# Patient Record
Sex: MALE | Race: WHITE | NOT HISPANIC OR LATINO | Employment: OTHER | URBAN - METROPOLITAN AREA
[De-identification: names, ages, dates, MRNs, and addresses within clinical notes are randomized per-mention and may not be internally consistent; named-entity substitution may affect disease eponyms.]

---

## 2020-09-25 ENCOUNTER — OFFICE VISIT (OUTPATIENT)
Dept: INTERNAL MEDICINE | Facility: CLINIC | Age: 55
End: 2020-09-25

## 2020-09-25 ENCOUNTER — LAB (OUTPATIENT)
Dept: LAB | Facility: HOSPITAL | Age: 55
End: 2020-09-25

## 2020-09-25 VITALS
TEMPERATURE: 97.1 F | SYSTOLIC BLOOD PRESSURE: 132 MMHG | HEART RATE: 88 BPM | BODY MASS INDEX: 49.15 KG/M2 | RESPIRATION RATE: 16 BRPM | HEIGHT: 65 IN | OXYGEN SATURATION: 98 % | DIASTOLIC BLOOD PRESSURE: 80 MMHG | WEIGHT: 295 LBS

## 2020-09-25 DIAGNOSIS — H60.311 ACUTE DIFFUSE OTITIS EXTERNA OF RIGHT EAR: ICD-10-CM

## 2020-09-25 DIAGNOSIS — M54.41 CHRONIC RIGHT-SIDED LOW BACK PAIN WITH RIGHT-SIDED SCIATICA: ICD-10-CM

## 2020-09-25 DIAGNOSIS — Z13.29 THYROID DISORDER SCREENING: ICD-10-CM

## 2020-09-25 DIAGNOSIS — Z13.220 LIPID SCREENING: ICD-10-CM

## 2020-09-25 DIAGNOSIS — E55.9 VITAMIN D DEFICIENCY: ICD-10-CM

## 2020-09-25 DIAGNOSIS — G89.29 CHRONIC RIGHT-SIDED LOW BACK PAIN WITH RIGHT-SIDED SCIATICA: ICD-10-CM

## 2020-09-25 DIAGNOSIS — Z13.1 SCREENING FOR DIABETES MELLITUS: ICD-10-CM

## 2020-09-25 DIAGNOSIS — H65.191 OTHER NON-RECURRENT ACUTE NONSUPPURATIVE OTITIS MEDIA OF RIGHT EAR: ICD-10-CM

## 2020-09-25 DIAGNOSIS — Z13.21 ENCOUNTER FOR VITAMIN DEFICIENCY SCREENING: ICD-10-CM

## 2020-09-25 DIAGNOSIS — R73.01 IMPAIRED FASTING GLUCOSE: ICD-10-CM

## 2020-09-25 DIAGNOSIS — I10 ESSENTIAL HYPERTENSION: Primary | ICD-10-CM

## 2020-09-25 LAB
25(OH)D3 SERPL-MCNC: 42.5 NG/ML (ref 30–100)
ALBUMIN SERPL-MCNC: 4.3 G/DL (ref 3.5–5.2)
ALBUMIN/GLOB SERPL: 1.5 G/DL
ALP SERPL-CCNC: 90 U/L (ref 39–117)
ALT SERPL W P-5'-P-CCNC: 36 U/L (ref 1–41)
ANION GAP SERPL CALCULATED.3IONS-SCNC: 7.3 MMOL/L (ref 5–15)
AST SERPL-CCNC: 25 U/L (ref 1–40)
BILIRUB SERPL-MCNC: 0.4 MG/DL (ref 0–1.2)
BUN SERPL-MCNC: 18 MG/DL (ref 6–20)
BUN/CREAT SERPL: 15.4 (ref 7–25)
CALCIUM SPEC-SCNC: 9.4 MG/DL (ref 8.6–10.5)
CHLORIDE SERPL-SCNC: 99 MMOL/L (ref 98–107)
CHOLEST SERPL-MCNC: 287 MG/DL (ref 0–200)
CO2 SERPL-SCNC: 32.7 MMOL/L (ref 22–29)
CREAT SERPL-MCNC: 1.17 MG/DL (ref 0.76–1.27)
DEPRECATED RDW RBC AUTO: 42.3 FL (ref 37–54)
ERYTHROCYTE [DISTWIDTH] IN BLOOD BY AUTOMATED COUNT: 13 % (ref 12.3–15.4)
FOLATE SERPL-MCNC: 9.7 NG/ML (ref 4.78–24.2)
GFR SERPL CREATININE-BSD FRML MDRD: 65 ML/MIN/1.73
GLOBULIN UR ELPH-MCNC: 2.9 GM/DL
GLUCOSE SERPL-MCNC: 98 MG/DL (ref 65–99)
HBA1C MFR BLD: 5.57 % (ref 4.8–5.6)
HCT VFR BLD AUTO: 49.8 % (ref 37.5–51)
HDLC SERPL-MCNC: 37 MG/DL (ref 40–60)
HGB BLD-MCNC: 17.4 G/DL (ref 13–17.7)
LDLC SERPL CALC-MCNC: 221 MG/DL (ref 0–100)
LDLC/HDLC SERPL: 5.97 {RATIO}
MCH RBC QN AUTO: 31.2 PG (ref 26.6–33)
MCHC RBC AUTO-ENTMCNC: 34.9 G/DL (ref 31.5–35.7)
MCV RBC AUTO: 89.2 FL (ref 79–97)
PLATELET # BLD AUTO: 296 10*3/MM3 (ref 140–450)
PMV BLD AUTO: 10.9 FL (ref 6–12)
POTASSIUM SERPL-SCNC: 4 MMOL/L (ref 3.5–5.2)
PROT SERPL-MCNC: 7.2 G/DL (ref 6–8.5)
RBC # BLD AUTO: 5.58 10*6/MM3 (ref 4.14–5.8)
SODIUM SERPL-SCNC: 139 MMOL/L (ref 136–145)
TRIGL SERPL-MCNC: 146 MG/DL (ref 0–150)
TSH SERPL DL<=0.05 MIU/L-ACNC: 1.1 UIU/ML (ref 0.27–4.2)
VIT B12 BLD-MCNC: 1087 PG/ML (ref 211–946)
VLDLC SERPL-MCNC: 29.2 MG/DL (ref 5–40)
WBC # BLD AUTO: 13.65 10*3/MM3 (ref 3.4–10.8)

## 2020-09-25 PROCEDURE — 85027 COMPLETE CBC AUTOMATED: CPT | Performed by: NURSE PRACTITIONER

## 2020-09-25 PROCEDURE — 80053 COMPREHEN METABOLIC PANEL: CPT | Performed by: NURSE PRACTITIONER

## 2020-09-25 PROCEDURE — 82306 VITAMIN D 25 HYDROXY: CPT | Performed by: NURSE PRACTITIONER

## 2020-09-25 PROCEDURE — 82607 VITAMIN B-12: CPT | Performed by: NURSE PRACTITIONER

## 2020-09-25 PROCEDURE — 83036 HEMOGLOBIN GLYCOSYLATED A1C: CPT | Performed by: NURSE PRACTITIONER

## 2020-09-25 PROCEDURE — 99214 OFFICE O/P EST MOD 30 MIN: CPT | Performed by: NURSE PRACTITIONER

## 2020-09-25 PROCEDURE — 84443 ASSAY THYROID STIM HORMONE: CPT | Performed by: NURSE PRACTITIONER

## 2020-09-25 PROCEDURE — 82746 ASSAY OF FOLIC ACID SERUM: CPT | Performed by: NURSE PRACTITIONER

## 2020-09-25 PROCEDURE — 80061 LIPID PANEL: CPT | Performed by: NURSE PRACTITIONER

## 2020-09-25 RX ORDER — CHLORTHALIDONE 25 MG/1
25 TABLET ORAL DAILY
COMMUNITY
Start: 2020-08-10 | End: 2020-09-25

## 2020-09-25 RX ORDER — OFLOXACIN 3 MG/ML
5 SOLUTION AURICULAR (OTIC) 2 TIMES DAILY
Qty: 5 ML | Refills: 0 | Status: SHIPPED | OUTPATIENT
Start: 2020-09-25 | End: 2020-10-02

## 2020-09-25 RX ORDER — PREDNISONE 1 MG/1
TABLET ORAL
Qty: 21 TABLET | Refills: 0 | Status: SHIPPED | OUTPATIENT
Start: 2020-09-25 | End: 2020-10-14

## 2020-09-25 RX ORDER — TAMSULOSIN HYDROCHLORIDE 0.4 MG/1
1 CAPSULE ORAL AS NEEDED
COMMUNITY
Start: 2020-08-10 | End: 2021-05-10 | Stop reason: SDUPTHER

## 2020-09-25 RX ORDER — AMOXICILLIN AND CLAVULANATE POTASSIUM 875; 125 MG/1; MG/1
1 TABLET, FILM COATED ORAL 2 TIMES DAILY
Qty: 20 TABLET | Refills: 0 | Status: SHIPPED | OUTPATIENT
Start: 2020-09-25 | End: 2020-10-05

## 2020-09-25 RX ORDER — NAPROXEN 500 MG/1
500 TABLET ORAL 2 TIMES DAILY PRN
Qty: 60 TABLET | Refills: 2 | Status: SHIPPED | OUTPATIENT
Start: 2020-09-25 | End: 2020-12-09 | Stop reason: HOSPADM

## 2020-09-25 RX ORDER — CYCLOBENZAPRINE HCL 10 MG
10 TABLET ORAL 3 TIMES DAILY PRN
Qty: 90 TABLET | Refills: 2 | Status: SHIPPED | OUTPATIENT
Start: 2020-09-25 | End: 2021-05-10 | Stop reason: SDUPTHER

## 2020-09-29 RX ORDER — ROSUVASTATIN CALCIUM 5 MG/1
5 TABLET, COATED ORAL NIGHTLY
Qty: 30 TABLET | Refills: 5 | Status: SHIPPED | OUTPATIENT
Start: 2020-09-29 | End: 2021-03-09

## 2020-10-14 ENCOUNTER — OFFICE VISIT (OUTPATIENT)
Dept: INTERNAL MEDICINE | Facility: CLINIC | Age: 55
End: 2020-10-14

## 2020-10-14 ENCOUNTER — HOSPITAL ENCOUNTER (OUTPATIENT)
Dept: GENERAL RADIOLOGY | Facility: HOSPITAL | Age: 55
Discharge: HOME | End: 2020-10-14
Admitting: NURSE PRACTITIONER

## 2020-10-14 VITALS
WEIGHT: 294 LBS | RESPIRATION RATE: 18 BRPM | OXYGEN SATURATION: 98 % | DIASTOLIC BLOOD PRESSURE: 78 MMHG | SYSTOLIC BLOOD PRESSURE: 132 MMHG | HEART RATE: 90 BPM | HEIGHT: 65 IN | BODY MASS INDEX: 48.98 KG/M2 | TEMPERATURE: 96.9 F

## 2020-10-14 DIAGNOSIS — R10.84 GENERALIZED ABDOMINAL PAIN: ICD-10-CM

## 2020-10-14 DIAGNOSIS — K21.9 GASTROESOPHAGEAL REFLUX DISEASE WITHOUT ESOPHAGITIS: ICD-10-CM

## 2020-10-14 DIAGNOSIS — R10.84 GENERALIZED ABDOMINAL PAIN: Primary | ICD-10-CM

## 2020-10-14 PROCEDURE — 74018 RADEX ABDOMEN 1 VIEW: CPT

## 2020-10-14 PROCEDURE — 99214 OFFICE O/P EST MOD 30 MIN: CPT | Performed by: NURSE PRACTITIONER

## 2020-10-14 RX ORDER — OMEPRAZOLE 40 MG/1
40 CAPSULE, DELAYED RELEASE ORAL DAILY
Qty: 30 CAPSULE | Refills: 5 | Status: SHIPPED | OUTPATIENT
Start: 2020-10-14 | End: 2021-05-10 | Stop reason: SDUPTHER

## 2020-10-15 DIAGNOSIS — D72.829 LEUKOCYTOSIS, UNSPECIFIED TYPE: ICD-10-CM

## 2020-10-15 DIAGNOSIS — R10.84 GENERALIZED ABDOMINAL PAIN: Primary | ICD-10-CM

## 2020-10-22 ENCOUNTER — HOSPITAL ENCOUNTER (OUTPATIENT)
Dept: CT IMAGING | Facility: HOSPITAL | Age: 55
Discharge: HOME | End: 2020-10-22
Admitting: NURSE PRACTITIONER

## 2020-10-22 DIAGNOSIS — D72.829 LEUKOCYTOSIS, UNSPECIFIED TYPE: ICD-10-CM

## 2020-10-22 DIAGNOSIS — R10.84 GENERALIZED ABDOMINAL PAIN: ICD-10-CM

## 2020-10-22 PROCEDURE — 74150 CT ABDOMEN W/O CONTRAST: CPT

## 2020-10-22 RX ORDER — METRONIDAZOLE 500 MG/1
500 TABLET ORAL 3 TIMES DAILY
Qty: 30 TABLET | Refills: 0 | Status: SHIPPED | OUTPATIENT
Start: 2020-10-22 | End: 2020-11-01

## 2020-10-22 RX ORDER — CIPROFLOXACIN 500 MG/1
500 TABLET, FILM COATED ORAL 2 TIMES DAILY
Qty: 20 TABLET | Refills: 0 | Status: SHIPPED | OUTPATIENT
Start: 2020-10-22 | End: 2020-11-01

## 2020-10-22 NOTE — PROGRESS NOTES
My chart message:    Scan shows colitis which is inflammation and infection in your intestines. It also shows gallstones. I am going to prescribe you two antibiotics for the bowel infection, Cipro and flagyl. You cannot drink any alcohol taking flagyl and for at least 3 days after finishing it. This will make you really sick. If symptoms do not get better then I will refer you to general surgery to have gallbladder removed.

## 2020-11-05 ENCOUNTER — OFFICE VISIT (OUTPATIENT)
Dept: INTERNAL MEDICINE | Facility: CLINIC | Age: 55
End: 2020-11-05

## 2020-11-05 VITALS
SYSTOLIC BLOOD PRESSURE: 140 MMHG | DIASTOLIC BLOOD PRESSURE: 88 MMHG | HEART RATE: 92 BPM | WEIGHT: 297 LBS | HEIGHT: 65 IN | TEMPERATURE: 97.1 F | RESPIRATION RATE: 16 BRPM | OXYGEN SATURATION: 98 % | BODY MASS INDEX: 49.48 KG/M2

## 2020-11-05 DIAGNOSIS — K80.20 CALCULUS OF GALLBLADDER WITHOUT CHOLECYSTITIS WITHOUT OBSTRUCTION: ICD-10-CM

## 2020-11-05 DIAGNOSIS — I10 ESSENTIAL HYPERTENSION: Primary | ICD-10-CM

## 2020-11-05 PROCEDURE — G0439 PPPS, SUBSEQ VISIT: HCPCS | Performed by: NURSE PRACTITIONER

## 2020-11-05 PROCEDURE — 99396 PREV VISIT EST AGE 40-64: CPT | Performed by: NURSE PRACTITIONER

## 2020-11-05 NOTE — PROGRESS NOTES
The ABCs of the Annual Wellness Visit  Subsequent Medicare Wellness Visit    Chief Complaint   Patient presents with   • Abdominal Pain     f/u  Pt states he could not take the antibiotic   • Knee Pain     right       Subjective   History of Present Illness:  Bryan Tamez is a 54 y.o. male who presents for a Subsequent Medicare Wellness Visit and abdominal pain.  Patient has right-sided abdominal pain and recently had CT scan of abdomen that showed possible colitis and cholelithiasis. He was prescribed Flagyl and Cipro but has refused to take these due to this being hard on his body.  He is still having intermittent abdominal pain.  He is willing to see general surgery for consult on gallbladder removal.  Right knee continues to cause him some significant pain.  He has upcoming bone scan in December to assess if he needs a another surgery on his knee.  He continues to have some blurry vision and will schedule appointment with ophthalmologist.  Blood pressure slightly elevated today and he feels this is due to his knee pain.  No shortness of breath or chest pain.      Overall healthy: Yes  Regular exercise:  Yes  Diet is well balanced:  Yes  Vitamin Supplement:  Yes  Alcohol intake:  Yes, moderate   Tobacco use:  Yes, does not want to quit  Cardiovascular risk is low:  moderate   PSA: no urinary issues, taking flomax.   Last colon screenin - normal, normal BM but having abdominal pain.   Regular dental exam:  No, will schedule  Regular eye exam:  No, will schedule  Immunizations up to date:  No, declines flu shot  Wear a seatbelt regularly:  Yes  Wear sunscreen regularly when outdoors: No, will start      HEALTH RISK ASSESSMENT    Recent Hospitalizations:  No hospitalization(s) within the last year.    Current Medical Providers:  Patient Care Team:  Molly Briceño APRN as PCP - General (Nurse Practitioner)    Smoking Status:  Social History     Tobacco Use   Smoking Status Current Every Day Smoker    • Packs/day: 1.00   • Years: 40.00   • Pack years: 40.00   • Types: Cigarettes   • Start date: 1980   Smokeless Tobacco Never Used       Alcohol Consumption:  Social History     Substance and Sexual Activity   Alcohol Use None       Depression Screen:   PHQ-2/PHQ-9 Depression Screening 11/5/2020   Little interest or pleasure in doing things 2   Feeling down, depressed, or hopeless 0   Trouble falling or staying asleep, or sleeping too much 0   Feeling tired or having little energy 0   Poor appetite or overeating 0   Feeling bad about yourself - or that you are a failure or have let yourself or your family down 0   Trouble concentrating on things, such as reading the newspaper or watching television 0   Moving or speaking so slowly that other people could have noticed. Or the opposite - being so fidgety or restless that you have been moving around a lot more than usual 0   Thoughts that you would be better off dead, or of hurting yourself in some way 0   Total Score 2   If you checked off any problems, how difficult have these problems made it for you to do your work, take care of things at home, or get along with other people? Not difficult at all       Fall Risk Screen:  CHANO Fall Risk Assessment has not been completed.    Health Habits and Functional and Cognitive Screening:  Functional & Cognitive Status 11/5/2020   Do you have difficulty preparing food and eating? No   Do you have difficulty bathing yourself, getting dressed or grooming yourself? No   Do you have difficulty using the toilet? No   Do you have difficulty moving around from place to place? No   Do you have trouble with steps or getting out of a bed or a chair? No   Current Diet Unhealthy Diet   Dental Exam Not up to date   Eye Exam Not up to date   Exercise (times per week) 0 times per week   Current Exercise Activities Include None   Do you need help using the phone?  No   Are you deaf or do you have serious difficulty hearing?  No   Do you  need help with transportation? No   Do you need help shopping? No   Do you need help preparing meals?  No   Do you need help with housework?  No   Do you need help with laundry? No   Do you need help taking your medications? No   Do you need help managing money? No   Do you ever drive or ride in a car without wearing a seat belt? No   Have you felt unusual stress, anger or loneliness in the last month? No   Who do you live with? Spouse   If you need help, do you have trouble finding someone available to you? No   Have you been bothered in the last four weeks by sexual problems? No   Do you have difficulty concentrating, remembering or making decisions? No         Does the patient have evidence of cognitive impairment? No    Asprin use counseling:Does not need ASA (and currently is not on it)    Age-appropriate Screening Schedule:  Refer to the list below for future screening recommendations based on patient's age, sex and/or medical conditions. Orders for these recommended tests are listed in the plan section. The patient has been provided with a written plan.    Health Maintenance   Topic Date Due   • TDAP/TD VACCINES (1 - Tdap) 12/23/1984   • ZOSTER VACCINE (1 of 2) 12/23/2015   • INFLUENZA VACCINE  09/25/2021 (Originally 8/1/2020)   • LIPID PANEL  09/25/2021   • COLONOSCOPY  07/26/2022          The following portions of the patient's history were reviewed and updated as appropriate: allergies, current medications, past family history, past medical history, past social history, past surgical history and problem list.    Outpatient Medications Prior to Visit   Medication Sig Dispense Refill   • omeprazole (priLOSEC) 40 MG capsule Take 1 capsule by mouth Daily. 30 capsule 5   • rosuvastatin (CRESTOR) 5 MG tablet Take 1 tablet by mouth Every Night. 30 tablet 5   • tamsulosin (FLOMAX) 0.4 MG capsule 24 hr capsule Take 1 capsule by mouth Daily.     • cyclobenzaprine (FLEXERIL) 10 MG tablet Take 1 tablet by mouth 3  (Three) Times a Day As Needed for Muscle Spasms. 90 tablet 2   • naproxen (NAPROSYN) 500 MG tablet Take 1 tablet by mouth 2 (Two) Times a Day As Needed for Mild Pain  or Moderate Pain  (Take with food). 60 tablet 2     No facility-administered medications prior to visit.        Patient Active Problem List   Diagnosis   • Chronic right-sided low back pain with right-sided sciatica   • Vitamin D deficiency   • Essential hypertension       Advanced Care Planning:  ACP discussion was held with the patient during this visit. Patient does not have an advance directive, information provided.    Review of Systems   Constitutional: Negative for activity change, appetite change, chills, diaphoresis, fatigue, fever, unexpected weight gain and unexpected weight loss.   HENT: Negative for ear discharge, ear pain, mouth sores, nosebleeds, sinus pressure, sneezing and sore throat.    Eyes: Positive for blurred vision. Negative for pain, discharge and itching.   Respiratory: Negative for cough, chest tightness, shortness of breath and wheezing.    Cardiovascular: Positive for leg swelling. Negative for chest pain and palpitations.   Gastrointestinal: Positive for abdominal pain, nausea, GERD and indigestion. Negative for constipation, diarrhea and vomiting.   Endocrine: Negative for heat intolerance, polydipsia and polyphagia.   Genitourinary: Negative for dysuria, flank pain, frequency, hematuria and urgency.   Musculoskeletal: Positive for arthralgias, back pain, joint swelling and myalgias. Negative for gait problem, neck pain and neck stiffness.   Skin: Negative for color change, pallor and rash.   Allergic/Immunologic: Negative for immunocompromised state.   Neurological: Positive for headache. Negative for seizures, speech difficulty, weakness and numbness.   Hematological: Negative for adenopathy.   Psychiatric/Behavioral: Negative for agitation, decreased concentration, sleep disturbance, suicidal ideas and depressed mood.  "The patient is not nervous/anxious.        Compared to one year ago, the patient feels his physical health is worse. Due to knee pain and abdominal pain.   Compared to one year ago, the patient feels his mental health is the same.    Reviewed chart for potential of high risk medication in the elderly: yes  Reviewed chart for potential of harmful drug interactions in the elderly:yes    Objective         Vitals:    11/05/20 0923   BP: 140/88   Pulse: 92   Resp: 16   Temp: 97.1 °F (36.2 °C)   TempSrc: Temporal   SpO2: 98%   Weight: 135 kg (297 lb)   Height: 165.1 cm (65\")   PainSc:   6   PainLoc: Knee  Comment: right       Body mass index is 49.42 kg/m².  Discussed the patient's BMI with him. The BMI is above average; BMI management plan is completed.    Physical Exam  Vitals signs reviewed.   Constitutional:       Appearance: Normal appearance. He is well-developed.   HENT:      Head: Normocephalic and atraumatic.      Right Ear: Hearing, tympanic membrane, ear canal and external ear normal.      Left Ear: Hearing, tympanic membrane, ear canal and external ear normal.      Nose: Nose normal.      Mouth/Throat:      Lips: Pink.      Mouth: Mucous membranes are moist.      Pharynx: Oropharynx is clear. Uvula midline.   Eyes:      General: Lids are normal.      Extraocular Movements: Extraocular movements intact.      Conjunctiva/sclera: Conjunctivae normal.      Pupils: Pupils are equal, round, and reactive to light.   Neck:      Thyroid: No thyromegaly.      Trachea: Trachea normal.   Cardiovascular:      Rate and Rhythm: Normal rate and regular rhythm.      Pulses:           Carotid pulses are 2+ on the right side and 2+ on the left side.     Heart sounds: Normal heart sounds.   Pulmonary:      Effort: Pulmonary effort is normal. No respiratory distress.      Breath sounds: Normal breath sounds.   Abdominal:      General: Bowel sounds are normal.      Palpations: Abdomen is soft.      Tenderness: There is generalized " abdominal tenderness.   Musculoskeletal:      Right knee: He exhibits decreased range of motion and swelling.      Right ankle: He exhibits swelling.      Left ankle: He exhibits swelling.      Right lower leg: He exhibits swelling.      Left lower leg: He exhibits swelling.      Comments: Generalized swelling in lower extremities bilaterally with right being slightly larger than left - at his baseline.    Skin:     General: Skin is warm and dry.      Capillary Refill: Capillary refill takes 2 to 3 seconds.   Neurological:      Mental Status: He is alert and oriented to person, place, and time.      GCS: GCS eye subscore is 4. GCS verbal subscore is 5. GCS motor subscore is 6.   Psychiatric:         Attention and Perception: Attention normal.         Mood and Affect: Mood normal.         Speech: Speech normal.         Behavior: Behavior normal. Behavior is cooperative.         Thought Content: Thought content normal.         Lab Results   Component Value Date    TRIG 146 09/25/2020    HDL 37 (L) 09/25/2020     (H) 09/25/2020    VLDL 29.2 09/25/2020    HGBA1C 5.57 09/25/2020        Assessment/Plan   Medicare Risks and Personalized Health Plan  CMS Preventative Services Quick Reference  Abdominal Aortic Aneurysm Screening  Advance Directive Discussion  Alcohol Misuse  Cardiovascular risk  Chronic Pain   Colon Cancer Screening  Fall Risk  Immunizations Discussed/Encouraged (specific immunizations; Influenza )  Inactivity/Sedentary  Lung Cancer Risk  Obesity/Overweight   Prostate Cancer Screening     The above risks/problems have been discussed with the patient.  Pertinent information has been shared with the patient in the After Visit Summary.  Follow up plans and orders are seen below in the Assessment/Plan Section.     Counseling was given to patient for the following topics: appropriate exercise, healthy eating habits, disease prevention, risk factors for cancer, importance of self testicular exam, importance  of immunizations, including risks and benefits, bone health, sun safety, seatbelt use and safe driving.      Plan of care reviewed with the patient at the conclusion of today's visit.  Education was provided regarding diagnosis, management, and any prescribed or recommended OTC medications.  Patient verbalized understanding of and agreement with management plan.       Diagnoses and all orders for this visit:    1. Essential hypertension (Primary)    2. Calculus of gallbladder without cholecystitis without obstruction  -     Ambulatory Referral to General Surgery      Follow Up:  Return if symptoms worsen or fail to improve.     An After Visit Summary and PPPS were given to the patient.

## 2020-11-20 ENCOUNTER — APPOINTMENT (OUTPATIENT)
Dept: PREADMISSION TESTING | Facility: HOSPITAL | Age: 55
End: 2020-11-20

## 2020-11-20 VITALS — HEIGHT: 66 IN | BODY MASS INDEX: 48.6 KG/M2 | WEIGHT: 302.4 LBS

## 2020-11-20 LAB
ALBUMIN SERPL-MCNC: 4.4 G/DL (ref 3.5–5.2)
ALBUMIN/GLOB SERPL: 2 G/DL
ALP SERPL-CCNC: 87 U/L (ref 39–117)
ALT SERPL W P-5'-P-CCNC: 45 U/L (ref 1–41)
ANION GAP SERPL CALCULATED.3IONS-SCNC: 12 MMOL/L (ref 5–15)
AST SERPL-CCNC: 30 U/L (ref 1–40)
BILIRUB SERPL-MCNC: 0.5 MG/DL (ref 0–1.2)
BUN SERPL-MCNC: 12 MG/DL (ref 6–20)
BUN/CREAT SERPL: 12.8 (ref 7–25)
CALCIUM SPEC-SCNC: 9.5 MG/DL (ref 8.6–10.5)
CHLORIDE SERPL-SCNC: 103 MMOL/L (ref 98–107)
CO2 SERPL-SCNC: 26 MMOL/L (ref 22–29)
CREAT SERPL-MCNC: 0.94 MG/DL (ref 0.76–1.27)
DEPRECATED RDW RBC AUTO: 42.2 FL (ref 37–54)
ERYTHROCYTE [DISTWIDTH] IN BLOOD BY AUTOMATED COUNT: 12.7 % (ref 12.3–15.4)
GFR SERPL CREATININE-BSD FRML MDRD: 84 ML/MIN/1.73
GLOBULIN UR ELPH-MCNC: 2.2 GM/DL
GLUCOSE SERPL-MCNC: 94 MG/DL (ref 65–99)
HCT VFR BLD AUTO: 49.4 % (ref 37.5–51)
HGB BLD-MCNC: 16.4 G/DL (ref 13–17.7)
MCH RBC QN AUTO: 29.9 PG (ref 26.6–33)
MCHC RBC AUTO-ENTMCNC: 33.2 G/DL (ref 31.5–35.7)
MCV RBC AUTO: 90.1 FL (ref 79–97)
PLATELET # BLD AUTO: 301 10*3/MM3 (ref 140–450)
PMV BLD AUTO: 9.8 FL (ref 6–12)
POTASSIUM SERPL-SCNC: 4.7 MMOL/L (ref 3.5–5.2)
PROT SERPL-MCNC: 6.6 G/DL (ref 6–8.5)
QT INTERVAL: 372 MS
QTC INTERVAL: 439 MS
RBC # BLD AUTO: 5.48 10*6/MM3 (ref 4.14–5.8)
SODIUM SERPL-SCNC: 141 MMOL/L (ref 136–145)
WBC # BLD AUTO: 12.57 10*3/MM3 (ref 3.4–10.8)

## 2020-11-20 PROCEDURE — 93010 ELECTROCARDIOGRAM REPORT: CPT | Performed by: INTERNAL MEDICINE

## 2020-11-20 PROCEDURE — 85027 COMPLETE CBC AUTOMATED: CPT

## 2020-11-20 PROCEDURE — 36415 COLL VENOUS BLD VENIPUNCTURE: CPT

## 2020-11-20 PROCEDURE — U0004 COV-19 TEST NON-CDC HGH THRU: HCPCS

## 2020-11-20 PROCEDURE — C9803 HOPD COVID-19 SPEC COLLECT: HCPCS

## 2020-11-20 PROCEDURE — 80053 COMPREHEN METABOLIC PANEL: CPT

## 2020-11-20 PROCEDURE — 93005 ELECTROCARDIOGRAM TRACING: CPT

## 2020-11-21 LAB — SARS-COV-2 RNA RESP QL NAA+PROBE: NOT DETECTED

## 2020-11-23 ENCOUNTER — HOSPITAL ENCOUNTER (OUTPATIENT)
Facility: HOSPITAL | Age: 55
Setting detail: HOSPITAL OUTPATIENT SURGERY
Discharge: HOME | End: 2020-11-23
Attending: SURGERY | Admitting: SURGERY

## 2020-11-23 ENCOUNTER — ANESTHESIA (OUTPATIENT)
Dept: PERIOP | Facility: HOSPITAL | Age: 55
End: 2020-11-23

## 2020-11-23 ENCOUNTER — ANESTHESIA EVENT (OUTPATIENT)
Dept: PERIOP | Facility: HOSPITAL | Age: 55
End: 2020-11-23

## 2020-11-23 VITALS
DIASTOLIC BLOOD PRESSURE: 65 MMHG | TEMPERATURE: 97.3 F | RESPIRATION RATE: 16 BRPM | HEART RATE: 75 BPM | OXYGEN SATURATION: 90 % | SYSTOLIC BLOOD PRESSURE: 98 MMHG

## 2020-11-23 DIAGNOSIS — K80.20 CHOLELITHIASIS: ICD-10-CM

## 2020-11-23 PROCEDURE — 25010000002 HYDROMORPHONE PER 4 MG: Performed by: NURSE ANESTHETIST, CERTIFIED REGISTERED

## 2020-11-23 PROCEDURE — 25010000002 NEOSTIGMINE 10 MG/10ML SOLUTION: Performed by: NURSE ANESTHETIST, CERTIFIED REGISTERED

## 2020-11-23 PROCEDURE — 25010000002 FENTANYL CITRATE (PF) 100 MCG/2ML SOLUTION: Performed by: NURSE ANESTHETIST, CERTIFIED REGISTERED

## 2020-11-23 PROCEDURE — 88304 TISSUE EXAM BY PATHOLOGIST: CPT | Performed by: SURGERY

## 2020-11-23 PROCEDURE — 25010000002 ONDANSETRON PER 1 MG: Performed by: NURSE ANESTHETIST, CERTIFIED REGISTERED

## 2020-11-23 PROCEDURE — 25010000002 DEXAMETHASONE PER 1 MG: Performed by: NURSE ANESTHETIST, CERTIFIED REGISTERED

## 2020-11-23 PROCEDURE — 25010000002 PROPOFOL 10 MG/ML EMULSION: Performed by: NURSE ANESTHETIST, CERTIFIED REGISTERED

## 2020-11-23 RX ORDER — HYDROCODONE BITARTRATE AND ACETAMINOPHEN 5; 325 MG/1; MG/1
1 TABLET ORAL EVERY 6 HOURS PRN
Qty: 10 TABLET | Refills: 0 | Status: SHIPPED | OUTPATIENT
Start: 2020-11-23 | End: 2021-03-09

## 2020-11-23 RX ORDER — FENTANYL CITRATE 50 UG/ML
50 INJECTION, SOLUTION INTRAMUSCULAR; INTRAVENOUS
Status: DISCONTINUED | OUTPATIENT
Start: 2020-11-23 | End: 2020-12-09 | Stop reason: HOSPADM

## 2020-11-23 RX ORDER — DOCUSATE SODIUM 250 MG
250 CAPSULE ORAL DAILY
Qty: 10 CAPSULE | Refills: 0 | Status: SHIPPED | OUTPATIENT
Start: 2020-11-23 | End: 2021-05-10 | Stop reason: SDUPTHER

## 2020-11-23 RX ORDER — LABETALOL HYDROCHLORIDE 5 MG/ML
INJECTION, SOLUTION INTRAVENOUS AS NEEDED
Status: DISCONTINUED | OUTPATIENT
Start: 2020-11-23 | End: 2020-11-23 | Stop reason: SURG

## 2020-11-23 RX ORDER — DEXAMETHASONE SODIUM PHOSPHATE 4 MG/ML
INJECTION, SOLUTION INTRA-ARTICULAR; INTRALESIONAL; INTRAMUSCULAR; INTRAVENOUS; SOFT TISSUE AS NEEDED
Status: DISCONTINUED | OUTPATIENT
Start: 2020-11-23 | End: 2020-11-23 | Stop reason: SURG

## 2020-11-23 RX ORDER — NEOSTIGMINE METHYLSULFATE 1 MG/ML
INJECTION, SOLUTION INTRAVENOUS AS NEEDED
Status: DISCONTINUED | OUTPATIENT
Start: 2020-11-23 | End: 2020-11-23 | Stop reason: SURG

## 2020-11-23 RX ORDER — HYDROMORPHONE HYDROCHLORIDE 1 MG/ML
0.5 INJECTION, SOLUTION INTRAMUSCULAR; INTRAVENOUS; SUBCUTANEOUS
Status: DISCONTINUED | OUTPATIENT
Start: 2020-11-23 | End: 2020-12-09 | Stop reason: HOSPADM

## 2020-11-23 RX ORDER — FAMOTIDINE 20 MG/1
20 TABLET, FILM COATED ORAL ONCE
Status: COMPLETED | OUTPATIENT
Start: 2020-11-23 | End: 2020-11-23

## 2020-11-23 RX ORDER — SODIUM CHLORIDE 0.9 % (FLUSH) 0.9 %
10 SYRINGE (ML) INJECTION EVERY 12 HOURS SCHEDULED
Status: DISCONTINUED | OUTPATIENT
Start: 2020-11-23 | End: 2020-11-23 | Stop reason: HOSPADM

## 2020-11-23 RX ORDER — FAMOTIDINE 10 MG/ML
20 INJECTION, SOLUTION INTRAVENOUS ONCE
Status: DISCONTINUED | OUTPATIENT
Start: 2020-11-23 | End: 2020-11-23 | Stop reason: HOSPADM

## 2020-11-23 RX ORDER — PROPOFOL 10 MG/ML
VIAL (ML) INTRAVENOUS AS NEEDED
Status: DISCONTINUED | OUTPATIENT
Start: 2020-11-23 | End: 2020-11-23 | Stop reason: SURG

## 2020-11-23 RX ORDER — ROCURONIUM BROMIDE 10 MG/ML
INJECTION, SOLUTION INTRAVENOUS AS NEEDED
Status: DISCONTINUED | OUTPATIENT
Start: 2020-11-23 | End: 2020-11-23 | Stop reason: SURG

## 2020-11-23 RX ORDER — ONDANSETRON 2 MG/ML
INJECTION INTRAMUSCULAR; INTRAVENOUS AS NEEDED
Status: DISCONTINUED | OUTPATIENT
Start: 2020-11-23 | End: 2020-11-23 | Stop reason: SURG

## 2020-11-23 RX ORDER — CEFAZOLIN SODIUM IN 0.9 % NACL 3 G/100 ML
3 INTRAVENOUS SOLUTION, PIGGYBACK (ML) INTRAVENOUS ONCE
Status: COMPLETED | OUTPATIENT
Start: 2020-11-23 | End: 2020-11-23

## 2020-11-23 RX ORDER — HYDROCODONE BITARTRATE AND ACETAMINOPHEN 5; 325 MG/1; MG/1
1 TABLET ORAL ONCE AS NEEDED
Status: DISCONTINUED | OUTPATIENT
Start: 2020-11-23 | End: 2020-12-09 | Stop reason: HOSPADM

## 2020-11-23 RX ORDER — FENTANYL CITRATE 50 UG/ML
INJECTION, SOLUTION INTRAMUSCULAR; INTRAVENOUS AS NEEDED
Status: DISCONTINUED | OUTPATIENT
Start: 2020-11-23 | End: 2020-11-23 | Stop reason: SURG

## 2020-11-23 RX ORDER — BUPIVACAINE HYDROCHLORIDE 5 MG/ML
INJECTION, SOLUTION PERINEURAL AS NEEDED
Status: DISCONTINUED | OUTPATIENT
Start: 2020-11-23 | End: 2020-11-23 | Stop reason: HOSPADM

## 2020-11-23 RX ORDER — GLYCOPYRROLATE 0.2 MG/ML
INJECTION INTRAMUSCULAR; INTRAVENOUS AS NEEDED
Status: DISCONTINUED | OUTPATIENT
Start: 2020-11-23 | End: 2020-11-23 | Stop reason: SURG

## 2020-11-23 RX ORDER — MAGNESIUM HYDROXIDE 1200 MG/15ML
LIQUID ORAL AS NEEDED
Status: DISCONTINUED | OUTPATIENT
Start: 2020-11-23 | End: 2020-11-23 | Stop reason: HOSPADM

## 2020-11-23 RX ORDER — SODIUM CHLORIDE, SODIUM LACTATE, POTASSIUM CHLORIDE, CALCIUM CHLORIDE 600; 310; 30; 20 MG/100ML; MG/100ML; MG/100ML; MG/100ML
9 INJECTION, SOLUTION INTRAVENOUS CONTINUOUS
Status: DISCONTINUED | OUTPATIENT
Start: 2020-11-23 | End: 2020-12-09 | Stop reason: HOSPADM

## 2020-11-23 RX ORDER — SODIUM CHLORIDE 0.9 % (FLUSH) 0.9 %
10 SYRINGE (ML) INJECTION AS NEEDED
Status: DISCONTINUED | OUTPATIENT
Start: 2020-11-23 | End: 2020-11-23 | Stop reason: HOSPADM

## 2020-11-23 RX ORDER — LIDOCAINE HYDROCHLORIDE 10 MG/ML
0.5 INJECTION, SOLUTION EPIDURAL; INFILTRATION; INTRACAUDAL; PERINEURAL ONCE AS NEEDED
Status: COMPLETED | OUTPATIENT
Start: 2020-11-23 | End: 2020-11-23

## 2020-11-23 RX ADMIN — DEXAMETHASONE SODIUM PHOSPHATE 8 MG: 4 INJECTION, SOLUTION INTRAMUSCULAR; INTRAVENOUS at 10:41

## 2020-11-23 RX ADMIN — HYDROMORPHONE HYDROCHLORIDE 0.5 MG: 1 INJECTION, SOLUTION INTRAMUSCULAR; INTRAVENOUS; SUBCUTANEOUS at 12:32

## 2020-11-23 RX ADMIN — LABETALOL HYDROCHLORIDE 10 MG: 5 INJECTION, SOLUTION INTRAVENOUS at 11:00

## 2020-11-23 RX ADMIN — PROPOFOL 200 MG: 10 INJECTION, EMULSION INTRAVENOUS at 10:37

## 2020-11-23 RX ADMIN — LIDOCAINE HYDROCHLORIDE 0.5 ML: 10 INJECTION, SOLUTION EPIDURAL; INFILTRATION; INTRACAUDAL; PERINEURAL at 09:10

## 2020-11-23 RX ADMIN — NEOSTIGMINE 4 MG: 1 INJECTION INTRAVENOUS at 11:40

## 2020-11-23 RX ADMIN — GLYCOPYRROLATE 0.6 MG: 0.2 INJECTION INTRAMUSCULAR; INTRAVENOUS at 11:40

## 2020-11-23 RX ADMIN — Medication 3 G: at 10:33

## 2020-11-23 RX ADMIN — ONDANSETRON 4 MG: 2 INJECTION INTRAMUSCULAR; INTRAVENOUS at 10:41

## 2020-11-23 RX ADMIN — FAMOTIDINE 20 MG: 20 TABLET, FILM COATED ORAL at 09:10

## 2020-11-23 RX ADMIN — EPHEDRINE SULFATE 10 MG: 50 INJECTION INTRAMUSCULAR; INTRAVENOUS; SUBCUTANEOUS at 11:20

## 2020-11-23 RX ADMIN — FENTANYL CITRATE 100 MCG: 50 INJECTION, SOLUTION INTRAMUSCULAR; INTRAVENOUS at 10:37

## 2020-11-23 RX ADMIN — SODIUM CHLORIDE, POTASSIUM CHLORIDE, SODIUM LACTATE AND CALCIUM CHLORIDE 9 ML/HR: 600; 310; 30; 20 INJECTION, SOLUTION INTRAVENOUS at 09:11

## 2020-11-23 RX ADMIN — ROCURONIUM BROMIDE 40 MG: 10 INJECTION INTRAVENOUS at 10:37

## 2020-11-23 RX ADMIN — FENTANYL CITRATE 50 MCG: 0.05 INJECTION, SOLUTION INTRAMUSCULAR; INTRAVENOUS at 12:00

## 2020-11-23 RX ADMIN — FENTANYL CITRATE 50 MCG: 0.05 INJECTION, SOLUTION INTRAMUSCULAR; INTRAVENOUS at 12:10

## 2020-11-23 NOTE — ANESTHESIA PREPROCEDURE EVALUATION
Anesthesia Evaluation     Patient summary reviewed and Nursing notes reviewed   NPO Solid Status: > 8 hours  NPO Liquid Status: > 2 hours           Airway   Mallampati: II  TM distance: >3 FB  Neck ROM: full  No difficulty expected  Dental    (+) poor dentition        Pulmonary    (+) sleep apnea,   (-) COPD, asthma, shortness of breath, recent URI, not a smoker  Cardiovascular     (+) hypertension, hyperlipidemia,   (-) past MI, dysrhythmias, angina      Neuro/Psych  (+) numbness,     (-) seizures, CVA  GI/Hepatic/Renal/Endo    (+) morbid obesity,    (-) liver disease, no renal disease, diabetes, no thyroid disorder    Musculoskeletal     Abdominal    Substance History      OB/GYN          Other                      Anesthesia Plan    ASA 2     general     intravenous induction     Anesthetic plan, all risks, benefits, and alternatives have been provided, discussed and informed consent has been obtained with: patient.    Plan discussed with CRNA.

## 2020-11-23 NOTE — ANESTHESIA POSTPROCEDURE EVALUATION
Patient: Bryan Tamez    Procedure Summary     Date: 11/23/20 Room / Location:  [facility]    Anesthesia Start: 1033 Anesthesia Stop:     Procedure: CHOLECYSTECTOMY LAPAROSCOPIC, POSSIBLE OPEN (N/A Abdomen) Diagnosis:     Surgeon: Buddy Angel MD Provider: Warren Cortez MD    Anesthesia Type: general ASA Status: 2          Anesthesia Type: general    Vitals  Vitals Value Taken Time   BP     Temp     Pulse     Resp     SpO2 88 % 11/23/20 1154   Vitals shown include unvalidated device data.        Post Anesthesia Care and Evaluation    Patient location during evaluation: PACU  Patient participation: complete - patient participated  Level of consciousness: awake  Pain score: 0  Pain management: adequate  Airway patency: patent  Anesthetic complications: No anesthetic complications  PONV Status: none  Cardiovascular status: acceptable and stable  Respiratory status: nasal cannula, unassisted and acceptable  Hydration status: acceptable

## 2020-11-23 NOTE — ANESTHESIA PROCEDURE NOTES
Airway  Urgency: elective    Date/Time: 11/23/2020 10:50 AM  Airway not difficult    General Information and Staff    Patient location during procedure: OR  CRNA: Sunil Steiner CRNA    Indications and Patient Condition  Indications for airway management: airway protection    Preoxygenated: yes  MILS not maintained throughout  Mask difficulty assessment: 1 - vent by mask    Final Airway Details  Final airway type: endotracheal airway      Successful airway: ETT  Cuffed: yes   Successful intubation technique: direct laryngoscopy  Facilitating devices/methods: intubating stylet  Endotracheal tube insertion site: oral  Blade: Elvin  Blade size: 4  ETT size (mm): 7.5  Cormack-Lehane Classification: grade III - view of epiglottis only  Placement verified by: chest auscultation and capnometry   Measured from: lips  ETT/EBT  to lips (cm): 20  Number of attempts at approach: 1  Assessment: lips, teeth, and gum same as pre-op and atraumatic intubation    Additional Comments  Negative epigastric sounds, Breath sound equal bilaterally with symmetric chest rise and fall

## 2020-11-24 LAB
CYTO UR: NORMAL
LAB AP CASE REPORT: NORMAL
LAB AP CLINICAL INFORMATION: NORMAL
PATH REPORT.FINAL DX SPEC: NORMAL
PATH REPORT.GROSS SPEC: NORMAL

## 2021-02-09 ENCOUNTER — LAB (OUTPATIENT)
Dept: LAB | Facility: HOSPITAL | Age: 56
End: 2021-02-09

## 2021-02-09 ENCOUNTER — OFFICE VISIT (OUTPATIENT)
Dept: INTERNAL MEDICINE | Facility: CLINIC | Age: 56
End: 2021-02-09

## 2021-02-09 DIAGNOSIS — R73.01 IMPAIRED FASTING GLUCOSE: ICD-10-CM

## 2021-02-09 DIAGNOSIS — I10 ESSENTIAL HYPERTENSION: Primary | ICD-10-CM

## 2021-02-09 DIAGNOSIS — E53.9 VITAMIN B DEFICIENCY: ICD-10-CM

## 2021-02-09 DIAGNOSIS — Z13.29 THYROID DISORDER SCREENING: ICD-10-CM

## 2021-02-09 DIAGNOSIS — E66.01 MORBIDLY OBESE (HCC): ICD-10-CM

## 2021-02-09 DIAGNOSIS — E55.9 VITAMIN D DEFICIENCY: ICD-10-CM

## 2021-02-09 DIAGNOSIS — T30.0 SKIN BURN: ICD-10-CM

## 2021-02-09 LAB
25(OH)D3 SERPL-MCNC: 28.6 NG/ML (ref 30–100)
ALBUMIN SERPL-MCNC: 3.9 G/DL (ref 3.5–5.2)
ALBUMIN/GLOB SERPL: 1.6 G/DL
ALP SERPL-CCNC: 81 U/L (ref 39–117)
ALT SERPL W P-5'-P-CCNC: 30 U/L (ref 1–41)
ANION GAP SERPL CALCULATED.3IONS-SCNC: 9.1 MMOL/L (ref 5–15)
AST SERPL-CCNC: 21 U/L (ref 1–40)
BILIRUB SERPL-MCNC: 0.2 MG/DL (ref 0–1.2)
BUN SERPL-MCNC: 12 MG/DL (ref 6–20)
BUN/CREAT SERPL: 12.2 (ref 7–25)
CALCIUM SPEC-SCNC: 9 MG/DL (ref 8.6–10.5)
CHLORIDE SERPL-SCNC: 104 MMOL/L (ref 98–107)
CHOLEST SERPL-MCNC: 236 MG/DL (ref 0–200)
CO2 SERPL-SCNC: 27.9 MMOL/L (ref 22–29)
CREAT SERPL-MCNC: 0.98 MG/DL (ref 0.76–1.27)
DEPRECATED RDW RBC AUTO: 43.3 FL (ref 37–54)
ERYTHROCYTE [DISTWIDTH] IN BLOOD BY AUTOMATED COUNT: 13.2 % (ref 12.3–15.4)
FOLATE SERPL-MCNC: 7.45 NG/ML (ref 4.78–24.2)
GFR SERPL CREATININE-BSD FRML MDRD: 79 ML/MIN/1.73
GLOBULIN UR ELPH-MCNC: 2.5 GM/DL
GLUCOSE SERPL-MCNC: 86 MG/DL (ref 65–99)
HBA1C MFR BLD: 5.45 % (ref 4.8–5.6)
HCT VFR BLD AUTO: 48.3 % (ref 37.5–51)
HDLC SERPL-MCNC: 44 MG/DL (ref 40–60)
HGB BLD-MCNC: 16.4 G/DL (ref 13–17.7)
LDLC SERPL CALC-MCNC: 169 MG/DL (ref 0–100)
LDLC/HDLC SERPL: 3.78 {RATIO}
MCH RBC QN AUTO: 30.4 PG (ref 26.6–33)
MCHC RBC AUTO-ENTMCNC: 34 G/DL (ref 31.5–35.7)
MCV RBC AUTO: 89.6 FL (ref 79–97)
PLATELET # BLD AUTO: 272 10*3/MM3 (ref 140–450)
PMV BLD AUTO: 10.7 FL (ref 6–12)
POTASSIUM SERPL-SCNC: 4.5 MMOL/L (ref 3.5–5.2)
PROT SERPL-MCNC: 6.4 G/DL (ref 6–8.5)
RBC # BLD AUTO: 5.39 10*6/MM3 (ref 4.14–5.8)
SODIUM SERPL-SCNC: 141 MMOL/L (ref 136–145)
TRIGL SERPL-MCNC: 128 MG/DL (ref 0–150)
TSH SERPL DL<=0.05 MIU/L-ACNC: 1.03 UIU/ML (ref 0.27–4.2)
VIT B12 BLD-MCNC: 985 PG/ML (ref 211–946)
VLDLC SERPL-MCNC: 23 MG/DL (ref 5–40)
WBC # BLD AUTO: 10.86 10*3/MM3 (ref 3.4–10.8)

## 2021-02-09 PROCEDURE — 80053 COMPREHEN METABOLIC PANEL: CPT | Performed by: NURSE PRACTITIONER

## 2021-02-09 PROCEDURE — 85027 COMPLETE CBC AUTOMATED: CPT | Performed by: NURSE PRACTITIONER

## 2021-02-09 PROCEDURE — 82306 VITAMIN D 25 HYDROXY: CPT | Performed by: NURSE PRACTITIONER

## 2021-02-09 PROCEDURE — 80061 LIPID PANEL: CPT | Performed by: NURSE PRACTITIONER

## 2021-02-09 PROCEDURE — 84443 ASSAY THYROID STIM HORMONE: CPT | Performed by: NURSE PRACTITIONER

## 2021-02-09 PROCEDURE — 82607 VITAMIN B-12: CPT | Performed by: NURSE PRACTITIONER

## 2021-02-09 PROCEDURE — 82746 ASSAY OF FOLIC ACID SERUM: CPT | Performed by: NURSE PRACTITIONER

## 2021-02-09 PROCEDURE — 99214 OFFICE O/P EST MOD 30 MIN: CPT | Performed by: NURSE PRACTITIONER

## 2021-02-09 PROCEDURE — 83036 HEMOGLOBIN GLYCOSYLATED A1C: CPT | Performed by: NURSE PRACTITIONER

## 2021-02-09 RX ORDER — PHENTERMINE HYDROCHLORIDE 37.5 MG/1
37.5 TABLET ORAL
Qty: 30 TABLET | Refills: 0 | Status: SHIPPED | OUTPATIENT
Start: 2021-02-09 | End: 2021-03-09 | Stop reason: SDUPTHER

## 2021-02-09 NOTE — PROGRESS NOTES
Subjective   Chief Complaint   Patient presents with   • Burn     rt hand   • Obesity   • Hypertension      Bryan Tamez is a 55 y.o. male here today for burn, hypertension, and obesity.  Patient burned right hand while cooking.  He is having a difficult time getting this to heal.  This is in the crease of his thumb and finger and cracks frequently when using his hand.  No redness or drainage.  Blood pressure has been stable and at goal.  He checks this at home and has been averaging around 130/80.  No headaches, changes in vision, shortness of breath, or chest pain.  Patient's had unexpected weight gain and is struggling with weight loss.  He had right knee replacement with revision.  Surgeon is considering a another revision but wants patient to lose weight.  He is following a low-cholesterol and fat diet.  He is not able to be physically active due to his right knee pain.  He is struggling with appetite control.    I have reviewed the following portions of the patient's history and confirmed they are accurate: allergies, current medications, past family history, past medical history, past social history, past surgical history and problem list    I have personally completed the patient's review of systems.    Review of Systems   Constitutional: Positive for fatigue and unexpected weight gain. Negative for activity change, appetite change, chills, diaphoresis, fever and unexpected weight loss.   HENT: Negative for ear discharge, ear pain, mouth sores, nosebleeds, sinus pressure, sneezing and sore throat.    Eyes: Negative for blurred vision, pain, discharge and itching.   Respiratory: Negative for cough, chest tightness, shortness of breath and wheezing.    Cardiovascular: Positive for leg swelling. Negative for chest pain and palpitations.   Gastrointestinal: Negative for abdominal pain, constipation, diarrhea, nausea, vomiting, GERD and indigestion.   Endocrine: Negative for heat intolerance, polydipsia and  "polyphagia.   Genitourinary: Negative for dysuria, flank pain, frequency, hematuria and urgency.   Musculoskeletal: Positive for arthralgias, back pain, joint swelling and myalgias. Negative for gait problem, neck pain and neck stiffness.   Skin: Negative for color change, pallor and rash.   Allergic/Immunologic: Negative for immunocompromised state.   Neurological: Negative for seizures, speech difficulty, weakness, numbness and headache.   Hematological: Negative for adenopathy.   Psychiatric/Behavioral: Negative for agitation, decreased concentration, sleep disturbance, suicidal ideas and depressed mood. The patient is not nervous/anxious.        Current Outpatient Medications on File Prior to Visit   Medication Sig   • HYDROcodone-acetaminophen (NORCO) 5-325 MG per tablet Take 1 tablet by mouth Every 6 (Six) Hours As Needed for Moderate Pain  (Pain).   • tamsulosin (FLOMAX) 0.4 MG capsule 24 hr capsule Take 1 capsule by mouth As Needed.   • cyclobenzaprine (FLEXERIL) 10 MG tablet Take 1 tablet by mouth 3 (Three) Times a Day As Needed for Muscle Spasms.   • docusate sodium (COLACE) 250 MG capsule Take 1 capsule by mouth Daily.   • omeprazole (priLOSEC) 40 MG capsule Take 1 capsule by mouth Daily.   • rosuvastatin (CRESTOR) 5 MG tablet Take 1 tablet by mouth Every Night.     No current facility-administered medications on file prior to visit.        Objective   Vitals:    02/09/21 1014 02/09/21 1046   BP: 140/84 132/86   Pulse: 82 78   Resp: 16    Temp: 97.7 °F (36.5 °C)    TempSrc: Temporal    SpO2: 98%    Weight: (!) 137 kg (303 lb)    Height: 167.6 cm (66\")    PainSc:   2    PainLoc: Comment: rt side pain      Body mass index is 48.91 kg/m².    Physical Exam  Vitals signs reviewed.   Constitutional:       Appearance: Normal appearance. He is well-developed.   HENT:      Head: Normocephalic and atraumatic.      Right Ear: Hearing, tympanic membrane, ear canal and external ear normal.      Left Ear: Hearing, " tympanic membrane, ear canal and external ear normal.      Nose: Nose normal.      Mouth/Throat:      Lips: Pink.      Mouth: Mucous membranes are moist.      Pharynx: Oropharynx is clear. Uvula midline.   Eyes:      General: Lids are normal.      Extraocular Movements: Extraocular movements intact.      Conjunctiva/sclera: Conjunctivae normal.      Pupils: Pupils are equal, round, and reactive to light.   Neck:      Thyroid: No thyromegaly.      Trachea: Trachea normal.   Cardiovascular:      Rate and Rhythm: Normal rate and regular rhythm.      Pulses:           Carotid pulses are 2+ on the right side and 2+ on the left side.     Heart sounds: Normal heart sounds.   Pulmonary:      Effort: Pulmonary effort is normal. No respiratory distress.      Breath sounds: Normal breath sounds.   Abdominal:      General: Bowel sounds are normal.      Palpations: Abdomen is soft.      Tenderness: There is generalized abdominal tenderness.   Musculoskeletal:      Right knee: He exhibits decreased range of motion and swelling.      Right ankle: He exhibits swelling.      Left ankle: He exhibits swelling.      Right lower leg: He exhibits swelling.      Left lower leg: He exhibits swelling.      Comments: Generalized swelling in lower extremities bilaterally with right being slightly larger than left - at his baseline.    Skin:     General: Skin is warm and dry.      Capillary Refill: Capillary refill takes 2 to 3 seconds.      Comments: Right hand burn on skin between thumb and 2nd finger. Skin is cracked. No redness or drainage.    Neurological:      Mental Status: He is alert and oriented to person, place, and time.      GCS: GCS eye subscore is 4. GCS verbal subscore is 5. GCS motor subscore is 6.   Psychiatric:         Attention and Perception: Attention normal.         Mood and Affect: Mood normal.         Speech: Speech normal.         Behavior: Behavior normal. Behavior is cooperative.         Thought Content: Thought  content normal.         Assessment/Plan   Problem List Items Addressed This Visit        Cardiac and Vasculature    Essential hypertension - Primary    Overview     Chronic issue stable requiring medication management and monitoring. Will eat well balanced heart healthy diet, drink adequate water, increase physical activity, and get adequate rest. Monitor blood pressures daily and contact office for any readings consistently above 140/90. Patient will report any associated symptoms such as headaches, blurry vision, or nausea. Patient will go to ER for any chest pressure or chest pain.          Relevant Orders    TSH Rfx On Abnormal To Free T4 (Completed)       Endocrine and Metabolic    Vitamin D deficiency    Overview     Chronic issue requiring diet changes, monitoring, and dietary supplement. Will increase dietary intake of Vitamin D through dairy milk, plant/nut based milk, and fortified foods such as juice and cereal. Will start dietary supplement as directed.            Relevant Orders    Vitamin D 25 Hydroxy (Completed)      Other Visit Diagnoses     Skin burn      New issue requiring medication management.  Discussed wound care.  Will apply Silvadene cream twice daily.    Relevant Medications    silver sulfadiazine (SILVADENE, SSD) 1 % cream    Morbidly obese (CMS/HCC)      Chronic issue unstable requiring monitoring with lifestyle and diet changes.  Discussed weight loss and BMI plan with patient. Discussed and educated on Mediterranean diet.  Will follow a heart healthy diet low in cholesterol and fat.  Will focus on eating less refined carbohydrates and sugars and replace this with complex carbs.  Will eat smaller portion sizes and increase physical activity as tolerated.  Will drink adequate water and get adequate sleep. Patient was educated on side effects of taking Adipex medications including risk of addiction, increased heart rate, blood pressure, anxiety, insomnia, and dry mouth. Patient verbalized  understanding and wants to continue with this medication. Patient will stop medication and schedule earlier follow up if these symptoms occur. Patient will increase water intake, follow a well balanced diet low in cholesterol/carbs/sugars, decrease portion sizes, and increase physical activity.   Start adipex.     Relevant Medications    phentermine (ADIPEX-P) 37.5 MG tablet    Other Relevant Orders    CBC (No Diff) (Completed)    Comprehensive Metabolic Panel (Completed)    Lipid Panel (Completed)    TSH Rfx On Abnormal To Free T4 (Completed)    Vitamin B deficiency      Chronic issue requiring diet changes, monitoring, and dietary supplement. Will increase dietary intake of Vitamin B through animal products and/or vitamin B12 or B complex supplement daily.      Relevant Orders    Vitamin B12 & Folate (Completed)    Impaired fasting glucose        Relevant Orders    Hemoglobin A1c (Completed)    Thyroid disorder screening        Relevant Orders    TSH Rfx On Abnormal To Free T4 (Completed)             Current Outpatient Medications:   •  HYDROcodone-acetaminophen (NORCO) 5-325 MG per tablet, Take 1 tablet by mouth Every 6 (Six) Hours As Needed for Moderate Pain  (Pain)., Disp: 10 tablet, Rfl: 0  •  tamsulosin (FLOMAX) 0.4 MG capsule 24 hr capsule, Take 1 capsule by mouth As Needed., Disp: , Rfl:   •  cyclobenzaprine (FLEXERIL) 10 MG tablet, Take 1 tablet by mouth 3 (Three) Times a Day As Needed for Muscle Spasms., Disp: 90 tablet, Rfl: 2  •  docusate sodium (COLACE) 250 MG capsule, Take 1 capsule by mouth Daily., Disp: 10 capsule, Rfl: 0  •  omeprazole (priLOSEC) 40 MG capsule, Take 1 capsule by mouth Daily., Disp: 30 capsule, Rfl: 5  •  phentermine (ADIPEX-P) 37.5 MG tablet, Take 1 tablet by mouth Every Morning Before Breakfast., Disp: 30 tablet, Rfl: 0  •  rosuvastatin (CRESTOR) 5 MG tablet, Take 1 tablet by mouth Every Night., Disp: 30 tablet, Rfl: 5  •  silver sulfadiazine (SILVADENE, SSD) 1 % cream, Apply   topically to the appropriate area as directed 2 (Two) Times a Day., Disp: 400 g, Rfl: 0       Plan of care reviewed with the patient at the conclusion of today's visit.  Education was provided regarding diagnosis, management, and any prescribed or recommended OTC medications.  Patient verbalized understanding of and agreement with management plan.     Return in about 1 month (around 3/9/2021), or if symptoms worsen or fail to improve.      Molly Ho, APRN    Please note that portions of this note were completed with a voice recognition program. Efforts were made to edit the dictations, but occasionally words are mistranscribed.

## 2021-02-12 NOTE — PROGRESS NOTES
My chart message:    Cholesterol is elevated so follow a low cholesterol and fat diet. Blood counts look better. Vitamin D is still low so make sure to take a daily multi-vitamin and over the counter vitamin D supplement of 2,000 IUs daily. All other labs look good.

## 2021-02-28 VITALS
HEART RATE: 78 BPM | HEIGHT: 66 IN | WEIGHT: 303 LBS | TEMPERATURE: 97.7 F | SYSTOLIC BLOOD PRESSURE: 132 MMHG | DIASTOLIC BLOOD PRESSURE: 86 MMHG | BODY MASS INDEX: 48.7 KG/M2 | RESPIRATION RATE: 16 BRPM | OXYGEN SATURATION: 98 %

## 2021-03-09 ENCOUNTER — OFFICE VISIT (OUTPATIENT)
Dept: INTERNAL MEDICINE | Facility: CLINIC | Age: 56
End: 2021-03-09

## 2021-03-09 VITALS
BODY MASS INDEX: 47.57 KG/M2 | HEART RATE: 77 BPM | DIASTOLIC BLOOD PRESSURE: 82 MMHG | RESPIRATION RATE: 16 BRPM | HEIGHT: 66 IN | WEIGHT: 296 LBS | OXYGEN SATURATION: 98 % | SYSTOLIC BLOOD PRESSURE: 134 MMHG | TEMPERATURE: 97.3 F

## 2021-03-09 DIAGNOSIS — I10 ESSENTIAL HYPERTENSION: Primary | ICD-10-CM

## 2021-03-09 DIAGNOSIS — E78.2 MIXED HYPERLIPIDEMIA: ICD-10-CM

## 2021-03-09 DIAGNOSIS — E66.01 MORBIDLY OBESE (HCC): ICD-10-CM

## 2021-03-09 PROCEDURE — 99214 OFFICE O/P EST MOD 30 MIN: CPT | Performed by: NURSE PRACTITIONER

## 2021-03-09 RX ORDER — PHENTERMINE HYDROCHLORIDE 37.5 MG/1
37.5 TABLET ORAL
Qty: 30 TABLET | Refills: 1 | Status: SHIPPED | OUTPATIENT
Start: 2021-03-09 | End: 2021-05-10 | Stop reason: SDUPTHER

## 2021-03-09 NOTE — PROGRESS NOTES
Subjective   Chief Complaint   Patient presents with   • Hypertension     f/u   • Obesity      Bryan Tamez is a 55 y.o. male here today for hypertension, hyperlipidemia, and obesity. He was started on adipex for obesity during last appt. He has been doing well with this. Helps with appetite control and fatigue. He's had positive weight loss of 7lbs. Blood pressure has remained stable and at goal. No headaches, changes in vision, shortness of breath or chest pain. No increased anxiety or insomnia with adipex. Labs showed LDL of 169 without taking crestor. He does not want to restart this and will focus on weight loss and healthy diet. Has difficulty being physically active due to severe right knee pain.     I have reviewed the following portions of the patient's history and confirmed they are accurate: allergies, current medications, past family history, past medical history, past social history, past surgical history and problem list    I have personally completed the patient's review of systems.    Review of Systems   Constitutional: Negative for activity change, appetite change, chills, diaphoresis, fatigue, fever, unexpected weight gain and unexpected weight loss.   HENT: Negative for ear discharge, ear pain, mouth sores, nosebleeds, sinus pressure, sneezing and sore throat.    Eyes: Negative for blurred vision, pain, discharge and itching.   Respiratory: Negative for cough, chest tightness, shortness of breath and wheezing.    Cardiovascular: Positive for leg swelling. Negative for chest pain and palpitations.   Gastrointestinal: Negative for abdominal pain, constipation, diarrhea, nausea, vomiting, GERD and indigestion.   Endocrine: Negative for heat intolerance, polydipsia and polyphagia.   Genitourinary: Negative for dysuria, flank pain, frequency, hematuria and urgency.   Musculoskeletal: Positive for arthralgias, back pain, joint swelling and myalgias. Negative for gait problem, neck pain and neck  "stiffness.   Skin: Negative for color change, pallor and rash.   Allergic/Immunologic: Negative for immunocompromised state.   Neurological: Negative for seizures, speech difficulty, weakness, numbness and headache.   Hematological: Negative for adenopathy.   Psychiatric/Behavioral: Negative for agitation, decreased concentration, sleep disturbance, suicidal ideas and depressed mood. The patient is not nervous/anxious.        Current Outpatient Medications on File Prior to Visit   Medication Sig   • cyclobenzaprine (FLEXERIL) 10 MG tablet Take 1 tablet by mouth 3 (Three) Times a Day As Needed for Muscle Spasms.   • docusate sodium (COLACE) 250 MG capsule Take 1 capsule by mouth Daily.   • omeprazole (priLOSEC) 40 MG capsule Take 1 capsule by mouth Daily.   • silver sulfadiazine (SILVADENE, SSD) 1 % cream Apply  topically to the appropriate area as directed 2 (Two) Times a Day.   • tamsulosin (FLOMAX) 0.4 MG capsule 24 hr capsule Take 1 capsule by mouth As Needed.   • [DISCONTINUED] phentermine (ADIPEX-P) 37.5 MG tablet Take 1 tablet by mouth Every Morning Before Breakfast.   • [DISCONTINUED] HYDROcodone-acetaminophen (NORCO) 5-325 MG per tablet Take 1 tablet by mouth Every 6 (Six) Hours As Needed for Moderate Pain  (Pain).   • [DISCONTINUED] rosuvastatin (CRESTOR) 5 MG tablet Take 1 tablet by mouth Every Night.     No current facility-administered medications on file prior to visit.       Objective   Vitals:    03/09/21 1003   BP: 134/82   Pulse: 77   Resp: 16   Temp: 97.3 °F (36.3 °C)   TempSrc: Temporal   SpO2: 98%   Weight: 134 kg (296 lb)   Height: 167.6 cm (66\")     Body mass index is 47.78 kg/m².    Physical Exam  Vitals reviewed.   Constitutional:       Appearance: Normal appearance. He is well-developed.   HENT:      Head: Normocephalic and atraumatic.      Right Ear: Hearing, tympanic membrane, ear canal and external ear normal.      Left Ear: Hearing, tympanic membrane, ear canal and external ear normal. "      Nose: Nose normal.      Mouth/Throat:      Lips: Pink.      Mouth: Mucous membranes are moist.      Pharynx: Oropharynx is clear. Uvula midline.   Eyes:      General: Lids are normal.      Extraocular Movements: Extraocular movements intact.      Conjunctiva/sclera: Conjunctivae normal.      Pupils: Pupils are equal, round, and reactive to light.   Neck:      Thyroid: No thyromegaly.      Trachea: Trachea normal.   Cardiovascular:      Rate and Rhythm: Normal rate and regular rhythm.      Pulses:           Carotid pulses are 2+ on the right side and 2+ on the left side.     Heart sounds: Normal heart sounds.   Pulmonary:      Effort: Pulmonary effort is normal. No respiratory distress.      Breath sounds: Normal breath sounds.   Musculoskeletal:      Right knee: Swelling present. Decreased range of motion.      Comments: Generalized swelling in lower extremities bilaterally with right being slightly larger than left - at his baseline.    Skin:     General: Skin is warm and dry.      Capillary Refill: Capillary refill takes 2 to 3 seconds.   Neurological:      Mental Status: He is alert and oriented to person, place, and time.      GCS: GCS eye subscore is 4. GCS verbal subscore is 5. GCS motor subscore is 6.   Psychiatric:         Attention and Perception: Attention normal.         Mood and Affect: Mood normal.         Speech: Speech normal.         Behavior: Behavior normal. Behavior is cooperative.         Thought Content: Thought content normal.         Assessment/Plan   Problem List Items Addressed This Visit        Cardiac and Vasculature    Essential hypertension - Primary    Overview     Chronic issue stable requiring medication management and monitoring. Will eat well balanced heart healthy diet, drink adequate water, increase physical activity, and get adequate rest. Monitor blood pressures daily and contact office for any readings consistently above 140/90. Patient will report any associated symptoms  such as headaches, blurry vision, or nausea. Patient will go to ER for any chest pressure or chest pain.            Mixed hyperlipidemia    Overview     Chronic unstable requiring medication management, lifestyle changes, and monitoring. Discussed how this being unstable increases risk of cardiovascular disease and adverse events. Will follow a hearth healthy diet low in cholesterol and fat. Discussed increasing fiber intake. Suggested fish oil or omega 3 supplement of 1200mg twice daily. Educated on how these help lower triglycerides and LDL. Will drink adequate water and increase physical activity as tolerated.   Will monitor closely to determine if crestor needs to be restarted.             Endocrine and Metabolic    Morbidly obese (CMS/HCC)    Overview     Chronic issue unstable requiring monitoring with lifestyle and diet changes.  Discussed weight loss and BMI plan with patient. Discussed and educated on Mediterranean diet.  Will follow a heart healthy diet low in cholesterol and fat.  Will focus on eating less refined carbohydrates and sugars and replace this with complex carbs.  Will eat smaller portion sizes and increase physical activity as tolerated.  Will drink adequate water and get adequate sleep. Patient was educated on side effects of taking Adipex medications including risk of addiction, increased heart rate, blood pressure, anxiety, insomnia, and dry mouth. Patient verbalized understanding and wants to continue with this medication. Patient will stop medication and schedule earlier follow up if these symptoms occur. Patient will increase water intake, follow a well balanced diet low in cholesterol/carbs/sugars, decrease portion sizes, and increase physical activity.   Continue adipex.          Relevant Medications    phentermine (ADIPEX-P) 37.5 MG tablet             Current Outpatient Medications:   •  cyclobenzaprine (FLEXERIL) 10 MG tablet, Take 1 tablet by mouth 3 (Three) Times a Day As Needed  for Muscle Spasms., Disp: 90 tablet, Rfl: 2  •  docusate sodium (COLACE) 250 MG capsule, Take 1 capsule by mouth Daily., Disp: 10 capsule, Rfl: 0  •  omeprazole (priLOSEC) 40 MG capsule, Take 1 capsule by mouth Daily., Disp: 30 capsule, Rfl: 5  •  phentermine (ADIPEX-P) 37.5 MG tablet, Take 1 tablet by mouth Every Morning Before Breakfast., Disp: 30 tablet, Rfl: 1  •  silver sulfadiazine (SILVADENE, SSD) 1 % cream, Apply  topically to the appropriate area as directed 2 (Two) Times a Day., Disp: 400 g, Rfl: 0  •  tamsulosin (FLOMAX) 0.4 MG capsule 24 hr capsule, Take 1 capsule by mouth As Needed., Disp: , Rfl:        Plan of care reviewed with the patient at the conclusion of today's visit.  Education was provided regarding diagnosis, management, and any prescribed or recommended OTC medications.  Patient verbalized understanding of and agreement with management plan.     Return in about 2 months (around 5/9/2021), or if symptoms worsen or fail to improve.      TAMAR Reyes    Please note that portions of this note were completed with a voice recognition program. Efforts were made to edit the dictations, but occasionally words are mistranscribed.

## 2021-03-10 RX ORDER — AZITHROMYCIN 250 MG/1
TABLET, FILM COATED ORAL
Qty: 6 TABLET | Refills: 0 | Status: SHIPPED | OUTPATIENT
Start: 2021-03-10 | End: 2021-05-10

## 2021-05-10 ENCOUNTER — LAB (OUTPATIENT)
Dept: LAB | Facility: HOSPITAL | Age: 56
End: 2021-05-10

## 2021-05-10 ENCOUNTER — OFFICE VISIT (OUTPATIENT)
Dept: INTERNAL MEDICINE | Facility: CLINIC | Age: 56
End: 2021-05-10

## 2021-05-10 VITALS
DIASTOLIC BLOOD PRESSURE: 86 MMHG | BODY MASS INDEX: 47.41 KG/M2 | SYSTOLIC BLOOD PRESSURE: 138 MMHG | WEIGHT: 295 LBS | TEMPERATURE: 96.9 F | RESPIRATION RATE: 16 BRPM | HEIGHT: 66 IN | OXYGEN SATURATION: 98 % | HEART RATE: 79 BPM

## 2021-05-10 DIAGNOSIS — I10 ESSENTIAL HYPERTENSION: ICD-10-CM

## 2021-05-10 DIAGNOSIS — N40.1 BENIGN PROSTATIC HYPERPLASIA (BPH) WITH STRAINING ON URINATION: Primary | ICD-10-CM

## 2021-05-10 DIAGNOSIS — E66.01 MORBIDLY OBESE (HCC): ICD-10-CM

## 2021-05-10 DIAGNOSIS — D72.829 LEUKOCYTOSIS, UNSPECIFIED TYPE: ICD-10-CM

## 2021-05-10 DIAGNOSIS — R39.16 BENIGN PROSTATIC HYPERPLASIA (BPH) WITH STRAINING ON URINATION: Primary | ICD-10-CM

## 2021-05-10 LAB
BASOPHILS # BLD AUTO: 0.11 10*3/MM3 (ref 0–0.2)
BASOPHILS NFR BLD AUTO: 0.9 % (ref 0–1.5)
DEPRECATED RDW RBC AUTO: 40.1 FL (ref 37–54)
EOSINOPHIL # BLD AUTO: 0.3 10*3/MM3 (ref 0–0.4)
EOSINOPHIL NFR BLD AUTO: 2.3 % (ref 0.3–6.2)
ERYTHROCYTE [DISTWIDTH] IN BLOOD BY AUTOMATED COUNT: 13 % (ref 12.3–15.4)
HCT VFR BLD AUTO: 49.5 % (ref 37.5–51)
HGB BLD-MCNC: 16.9 G/DL (ref 13–17.7)
IMM GRANULOCYTES # BLD AUTO: 0.11 10*3/MM3 (ref 0–0.05)
IMM GRANULOCYTES NFR BLD AUTO: 0.9 % (ref 0–0.5)
LYMPHOCYTES # BLD AUTO: 3.68 10*3/MM3 (ref 0.7–3.1)
LYMPHOCYTES NFR BLD AUTO: 28.7 % (ref 19.6–45.3)
MCH RBC QN AUTO: 29.6 PG (ref 26.6–33)
MCHC RBC AUTO-ENTMCNC: 34.1 G/DL (ref 31.5–35.7)
MCV RBC AUTO: 86.8 FL (ref 79–97)
MONOCYTES # BLD AUTO: 0.9 10*3/MM3 (ref 0.1–0.9)
MONOCYTES NFR BLD AUTO: 7 % (ref 5–12)
NEUTROPHILS NFR BLD AUTO: 60.2 % (ref 42.7–76)
NEUTROPHILS NFR BLD AUTO: 7.72 10*3/MM3 (ref 1.7–7)
NRBC BLD AUTO-RTO: 0 /100 WBC (ref 0–0.2)
PLATELET # BLD AUTO: 302 10*3/MM3 (ref 140–450)
PMV BLD AUTO: 10.3 FL (ref 6–12)
RBC # BLD AUTO: 5.7 10*6/MM3 (ref 4.14–5.8)
WBC # BLD AUTO: 12.82 10*3/MM3 (ref 3.4–10.8)

## 2021-05-10 PROCEDURE — 99214 OFFICE O/P EST MOD 30 MIN: CPT | Performed by: NURSE PRACTITIONER

## 2021-05-10 PROCEDURE — 85025 COMPLETE CBC W/AUTO DIFF WBC: CPT | Performed by: NURSE PRACTITIONER

## 2021-05-10 PROCEDURE — 84153 ASSAY OF PSA TOTAL: CPT | Performed by: NURSE PRACTITIONER

## 2021-05-10 RX ORDER — OMEPRAZOLE 40 MG/1
40 CAPSULE, DELAYED RELEASE ORAL DAILY
Qty: 30 CAPSULE | Refills: 5 | Status: SHIPPED | OUTPATIENT
Start: 2021-05-10

## 2021-05-10 RX ORDER — CYCLOBENZAPRINE HCL 10 MG
10 TABLET ORAL 3 TIMES DAILY PRN
Qty: 90 TABLET | Refills: 2 | Status: SHIPPED | OUTPATIENT
Start: 2021-05-10 | End: 2022-03-28

## 2021-05-10 RX ORDER — DOCUSATE SODIUM 250 MG
250 CAPSULE ORAL DAILY
Qty: 10 CAPSULE | Refills: 0 | Status: SHIPPED | OUTPATIENT
Start: 2021-05-10

## 2021-05-10 RX ORDER — PHENTERMINE HYDROCHLORIDE 37.5 MG/1
37.5 TABLET ORAL
Qty: 30 TABLET | Refills: 1 | Status: SHIPPED | OUTPATIENT
Start: 2021-05-10 | End: 2021-09-10 | Stop reason: SDUPTHER

## 2021-05-10 RX ORDER — TAMSULOSIN HYDROCHLORIDE 0.4 MG/1
1 CAPSULE ORAL DAILY
Qty: 90 CAPSULE | Refills: 3 | Status: SHIPPED | OUTPATIENT
Start: 2021-05-10 | End: 2022-05-09 | Stop reason: SDUPTHER

## 2021-05-10 NOTE — PROGRESS NOTES
Subjective   Chief Complaint   Patient presents with   • Difficulty Urinating   • Obesity      Bryan Tamez is a 55 y.o. male here today for difficulty urinating, obesity, and hypertension. Patient previously diagnosed with BPH and started on flomax that greatly helped with symptoms. No consult with urology. Recently ran out of medication and quickly began having symptoms. Having to strain to urinate with weak stream. Does not feel he is emptying his bladder completely and has constant urge to urinate. Unable to provide urine sample today. Patient taking adipex for weight loss and has been doing well. Has increased energy that has helped him to be more physically active. Adipex is helping with appetite suppression. No side effects. BP has been running normal. This is elevated today but reports his bilateral knee pain has been severe today and he is frustrated with inability to urinate. Continues to see ortho specialist for knees. Had bone scan done today to determine next step in surgery for right knee. Having some increased pain and effusion of left knee and getting MRI done soon. Ortho suggested he lose weight asap to help with upcoming surgeries and prevent further complications. No shortness of breath or chest pain.      I have reviewed the following portions of the patient's history and confirmed they are accurate: allergies, current medications, past family history, past medical history, past social history, past surgical history and problem list    I have personally completed the patient's review of systems.    Review of Systems   Constitutional: Negative for activity change, appetite change, chills, diaphoresis, fatigue, fever, unexpected weight gain and unexpected weight loss.   HENT: Negative for ear discharge, ear pain, mouth sores, nosebleeds, sinus pressure, sneezing and sore throat.    Eyes: Negative for blurred vision, pain, discharge and itching.   Respiratory: Negative for cough, chest tightness,  shortness of breath and wheezing.    Cardiovascular: Positive for leg swelling. Negative for chest pain and palpitations.   Gastrointestinal: Negative for abdominal pain, constipation, diarrhea, nausea, vomiting, GERD and indigestion.   Endocrine: Negative for heat intolerance, polydipsia and polyphagia.   Genitourinary: Positive for difficulty urinating. Negative for dysuria, flank pain, frequency, hematuria and urgency.   Musculoskeletal: Positive for arthralgias, back pain, joint swelling and myalgias. Negative for gait problem, neck pain and neck stiffness.   Skin: Negative for color change, pallor and rash.   Allergic/Immunologic: Negative for immunocompromised state.   Neurological: Negative for seizures, speech difficulty, weakness, numbness and headache.   Hematological: Negative for adenopathy.   Psychiatric/Behavioral: Negative for agitation, decreased concentration, sleep disturbance, suicidal ideas and depressed mood. The patient is not nervous/anxious.        Current Outpatient Medications on File Prior to Visit   Medication Sig   • silver sulfadiazine (SILVADENE, SSD) 1 % cream Apply  topically to the appropriate area as directed 2 (Two) Times a Day.   • [DISCONTINUED] azithromycin (ZITHROMAX) 250 MG tablet Take 2 tablets the first day, then 1 tablet daily for 4 days.   • [DISCONTINUED] cyclobenzaprine (FLEXERIL) 10 MG tablet Take 1 tablet by mouth 3 (Three) Times a Day As Needed for Muscle Spasms.   • [DISCONTINUED] docusate sodium (COLACE) 250 MG capsule Take 1 capsule by mouth Daily.   • [DISCONTINUED] omeprazole (priLOSEC) 40 MG capsule Take 1 capsule by mouth Daily.   • [DISCONTINUED] phentermine (ADIPEX-P) 37.5 MG tablet Take 1 tablet by mouth Every Morning Before Breakfast.   • [DISCONTINUED] tamsulosin (FLOMAX) 0.4 MG capsule 24 hr capsule Take 1 capsule by mouth As Needed.     No current facility-administered medications on file prior to visit.       Objective   Vitals:    05/10/21 1102  "05/10/21 1123 05/10/21 1141   BP: 162/94 154/92 138/86   Pulse: 79     Resp: 16     Temp: 96.9 °F (36.1 °C)     TempSrc: Temporal     SpO2: 98%     Weight: 134 kg (295 lb)     Height: 167.6 cm (66\")       Body mass index is 47.61 kg/m².    Physical Exam  Vitals reviewed.   Constitutional:       Appearance: Normal appearance. He is well-developed.   HENT:      Head: Normocephalic and atraumatic.      Right Ear: Hearing, tympanic membrane, ear canal and external ear normal.      Left Ear: Hearing, tympanic membrane, ear canal and external ear normal.      Nose: Nose normal.      Mouth/Throat:      Lips: Pink.      Mouth: Mucous membranes are moist.      Pharynx: Oropharynx is clear. Uvula midline.   Eyes:      General: Lids are normal.      Extraocular Movements: Extraocular movements intact.      Conjunctiva/sclera: Conjunctivae normal.      Pupils: Pupils are equal, round, and reactive to light.   Neck:      Thyroid: No thyromegaly.      Trachea: Trachea normal.   Cardiovascular:      Rate and Rhythm: Normal rate and regular rhythm.      Pulses:           Carotid pulses are 2+ on the right side and 2+ on the left side.     Heart sounds: Normal heart sounds.   Pulmonary:      Effort: Pulmonary effort is normal. No respiratory distress.      Breath sounds: Normal breath sounds.   Musculoskeletal:      Right knee: Swelling present. Decreased range of motion. Tenderness present over the medial joint line and lateral joint line.      Left knee: Swelling present. Decreased range of motion. Tenderness present over the medial joint line.      Comments: Generalized swelling in lower extremities bilaterally with right being slightly larger than left - at his baseline.    Skin:     General: Skin is warm and dry.      Capillary Refill: Capillary refill takes 2 to 3 seconds.   Neurological:      Mental Status: He is alert and oriented to person, place, and time.      GCS: GCS eye subscore is 4. GCS verbal subscore is 5. GCS " motor subscore is 6.   Psychiatric:         Attention and Perception: Attention normal.         Mood and Affect: Mood normal.         Speech: Speech normal.         Behavior: Behavior normal. Behavior is cooperative.         Thought Content: Thought content normal.         Assessment/Plan   Problem List Items Addressed This Visit        Cardiac and Vasculature    Essential hypertension    Overview     Chronic issue unstable requiring medication management and monitoring. Will eat well balanced heart healthy diet, drink adequate water, increase physical activity, and get adequate rest. Monitor blood pressures daily and contact office for any readings consistently above 140/90. Patient will report any associated symptoms such as headaches, blurry vision, or nausea. Patient will go to ER for any chest pressure or chest pain.   Does not want to start meds for BP today. Will continue to closely monitor.             Endocrine and Metabolic    Morbidly obese (CMS/HCC)    Overview     Chronic issue unstable requiring monitoring with lifestyle and diet changes.  Discussed weight loss and BMI plan with patient. Discussed and educated on Mediterranean diet.  Will follow a heart healthy diet low in cholesterol and fat.  Will focus on eating less refined carbohydrates and sugars and replace this with complex carbs.  Will eat smaller portion sizes and increase physical activity as tolerated.  Will drink adequate water and get adequate sleep. Patient was educated on side effects of taking Adipex medications including risk of addiction, increased heart rate, blood pressure, anxiety, insomnia, and dry mouth. Patient verbalized understanding and wants to continue with this medication. Patient will stop medication and schedule earlier follow up if these symptoms occur. Patient will increase water intake, follow a well balanced diet low in cholesterol/carbs/sugars, decrease portion sizes, and increase physical activity.   Continue  adipex.          Relevant Medications    phentermine (ADIPEX-P) 37.5 MG tablet      Other Visit Diagnoses     Benign prostatic hyperplasia (BPH) with straining on urination    -  Primary  Chronic issue unstable requiring medication management and referral to urology.  Restart flomax.       Relevant Orders    PSA DIAGNOSTIC ONLY    Ambulatory Referral to Urology    Leukocytosis, unspecified type        Relevant Orders    CBC & Differential                 Current Outpatient Medications:   •  cyclobenzaprine (FLEXERIL) 10 MG tablet, Take 1 tablet by mouth 3 (Three) Times a Day As Needed for Muscle Spasms., Disp: 90 tablet, Rfl: 2  •  docusate sodium (COLACE) 250 MG capsule, Take 1 capsule by mouth Daily., Disp: 10 capsule, Rfl: 0  •  omeprazole (priLOSEC) 40 MG capsule, Take 1 capsule by mouth Daily., Disp: 30 capsule, Rfl: 5  •  phentermine (ADIPEX-P) 37.5 MG tablet, Take 1 tablet by mouth Every Morning Before Breakfast., Disp: 30 tablet, Rfl: 1  •  silver sulfadiazine (SILVADENE, SSD) 1 % cream, Apply  topically to the appropriate area as directed 2 (Two) Times a Day., Disp: 400 g, Rfl: 0  •  tamsulosin (FLOMAX) 0.4 MG capsule 24 hr capsule, Take 1 capsule by mouth Daily., Disp: 90 capsule, Rfl: 3       Plan of care reviewed with the patient at the conclusion of today's visit.  Education was provided regarding diagnosis, management, and any prescribed or recommended OTC medications.  Patient verbalized understanding of and agreement with management plan.     Return in about 3 months (around 8/10/2021), or if symptoms worsen or fail to improve.      TAMAR Reyes    Please note that portions of this note were completed with a voice recognition program. Efforts were made to edit the dictations, but occasionally words are mistranscribed.

## 2021-05-11 ENCOUNTER — TELEPHONE (OUTPATIENT)
Dept: INTERNAL MEDICINE | Facility: CLINIC | Age: 56
End: 2021-05-11

## 2021-05-11 LAB — PSA SERPL-MCNC: 0.61 NG/ML (ref 0–4)

## 2021-05-11 NOTE — PROGRESS NOTES
My chart message:    Prostate lab is normal. You should be getting call soon with urology appt. White blood cell count is a bit elevated but this has been your baseline for past 3 years and most likely due to right knee issues. Will continue to monitor this.

## 2021-05-11 NOTE — TELEPHONE ENCOUNTER
I sent him "[employer]t message but here is result note:    Prostate lab is normal. You should be getting call soon with urology appt. White blood cell count is a bit elevated but this has been your baseline for past 3 years and most likely due to right knee issues. Will continue to monitor this.

## 2021-05-11 NOTE — TELEPHONE ENCOUNTER
Caller: Bryan Tamez    Relationship: Self    Best call back number: [phone]    What is the best time to reach you:ANYTIME     Who are you requesting to speak with (clinical staff, provider,  specific staff member): CLINICAL STAFF    Do you know the name of the person who called: SELF    What was the call regarding: PATIENT WANTS A CALL ABOUT HIS TEST RESULTS.    Do you require a callback: YES

## 2021-09-10 ENCOUNTER — OFFICE VISIT (OUTPATIENT)
Dept: INTERNAL MEDICINE | Facility: CLINIC | Age: 56
End: 2021-09-10

## 2021-09-10 VITALS
BODY MASS INDEX: 49.02 KG/M2 | SYSTOLIC BLOOD PRESSURE: 132 MMHG | TEMPERATURE: 96.9 F | WEIGHT: 305 LBS | DIASTOLIC BLOOD PRESSURE: 82 MMHG | HEART RATE: 90 BPM | HEIGHT: 66 IN | RESPIRATION RATE: 16 BRPM | OXYGEN SATURATION: 95 %

## 2021-09-10 DIAGNOSIS — G89.29 CHRONIC RIGHT-SIDED LOW BACK PAIN WITH RIGHT-SIDED SCIATICA: ICD-10-CM

## 2021-09-10 DIAGNOSIS — E66.01 MORBIDLY OBESE (HCC): ICD-10-CM

## 2021-09-10 DIAGNOSIS — R21 RASH: ICD-10-CM

## 2021-09-10 DIAGNOSIS — M54.41 CHRONIC RIGHT-SIDED LOW BACK PAIN WITH RIGHT-SIDED SCIATICA: ICD-10-CM

## 2021-09-10 DIAGNOSIS — I10 ESSENTIAL HYPERTENSION: Primary | ICD-10-CM

## 2021-09-10 PROCEDURE — 99214 OFFICE O/P EST MOD 30 MIN: CPT | Performed by: NURSE PRACTITIONER

## 2021-09-10 RX ORDER — PHENTERMINE HYDROCHLORIDE 37.5 MG/1
37.5 TABLET ORAL
Qty: 30 TABLET | Refills: 1 | Status: SHIPPED | OUTPATIENT
Start: 2021-09-10 | End: 2021-12-14

## 2021-09-10 RX ORDER — TRIAMCINOLONE ACETONIDE 1 MG/G
CREAM TOPICAL
COMMUNITY
Start: 2021-08-30 | End: 2021-12-14

## 2021-09-10 NOTE — PROGRESS NOTES
Subjective   Chief Complaint   Patient presents with   • Hypertension     f/u   • Rash     on R leg from sabi Tamez is a 55 y.o. male here today for hypertension, back pain, rash, and obesity.     I have reviewed the following portions of the patient's history and confirmed they are accurate: allergies, current medications, past family history, past medical history, past social history, past surgical history and problem list    I have personally completed the patient's review of systems.    Review of Systems   Constitutional: Negative for activity change, appetite change, chills, diaphoresis, fatigue, fever, unexpected weight gain and unexpected weight loss.   HENT: Negative for ear discharge, ear pain, mouth sores, nosebleeds, sinus pressure, sneezing and sore throat.    Eyes: Negative for blurred vision, pain, discharge and itching.   Respiratory: Negative for cough, chest tightness, shortness of breath and wheezing.    Cardiovascular: Positive for leg swelling. Negative for chest pain and palpitations.   Gastrointestinal: Negative for abdominal pain, constipation, diarrhea, nausea, vomiting, GERD and indigestion.   Endocrine: Negative for heat intolerance, polydipsia and polyphagia.   Genitourinary: Positive for difficulty urinating. Negative for dysuria, flank pain, frequency, hematuria and urgency.   Musculoskeletal: Positive for arthralgias, back pain, joint swelling and myalgias. Negative for gait problem, neck pain and neck stiffness.   Skin: Negative for color change, pallor and rash.   Allergic/Immunologic: Negative for immunocompromised state.   Neurological: Negative for seizures, speech difficulty, weakness, numbness and headache.   Hematological: Negative for adenopathy.   Psychiatric/Behavioral: Negative for agitation, decreased concentration, sleep disturbance, suicidal ideas and depressed mood. The patient is not nervous/anxious.        Current Outpatient Medications on File Prior to  "Visit   Medication Sig   • cyclobenzaprine (FLEXERIL) 10 MG tablet Take 1 tablet by mouth 3 (Three) Times a Day As Needed for Muscle Spasms.   • docusate sodium (COLACE) 250 MG capsule Take 1 capsule by mouth Daily.   • omeprazole (priLOSEC) 40 MG capsule Take 1 capsule by mouth Daily.   • tamsulosin (FLOMAX) 0.4 MG capsule 24 hr capsule Take 1 capsule by mouth Daily.   • triamcinolone (KENALOG) 0.1 % cream      No current facility-administered medications on file prior to visit.       Objective   Vitals:    09/10/21 1103   BP: 132/82   Pulse: 90   Resp: 16   Temp: 96.9 °F (36.1 °C)   TempSrc: Temporal   SpO2: 95%   Weight: (!) 138 kg (305 lb)   Height: 167.6 cm (66\")     Body mass index is 49.23 kg/m².    Physical Exam  Vitals reviewed.   Constitutional:       Appearance: Normal appearance. He is well-developed.   HENT:      Head: Normocephalic and atraumatic.      Right Ear: Hearing, tympanic membrane, ear canal and external ear normal.      Left Ear: Hearing, tympanic membrane, ear canal and external ear normal.      Nose: Nose normal.      Mouth/Throat:      Lips: Pink.      Mouth: Mucous membranes are moist.      Pharynx: Oropharynx is clear. Uvula midline.   Eyes:      General: Lids are normal.      Extraocular Movements: Extraocular movements intact.      Conjunctiva/sclera: Conjunctivae normal.      Pupils: Pupils are equal, round, and reactive to light.   Neck:      Thyroid: No thyromegaly.      Trachea: Trachea normal.   Cardiovascular:      Rate and Rhythm: Normal rate and regular rhythm.      Pulses:           Carotid pulses are 2+ on the right side and 2+ on the left side.     Heart sounds: Normal heart sounds.   Pulmonary:      Effort: Pulmonary effort is normal. No respiratory distress.      Breath sounds: Normal breath sounds.   Musculoskeletal:      Right knee: Swelling present. Decreased range of motion. Tenderness present over the medial joint line and lateral joint line.      Left knee: Swelling " present. Decreased range of motion. Tenderness present over the medial joint line.      Comments: Generalized swelling in lower extremities bilaterally with right being slightly larger than left - at his baseline.    Skin:     General: Skin is warm and dry.      Capillary Refill: Capillary refill takes 2 to 3 seconds.   Neurological:      Mental Status: He is alert and oriented to person, place, and time.      GCS: GCS eye subscore is 4. GCS verbal subscore is 5. GCS motor subscore is 6.   Psychiatric:         Attention and Perception: Attention normal.         Mood and Affect: Mood normal.         Speech: Speech normal.         Behavior: Behavior normal. Behavior is cooperative.         Thought Content: Thought content normal.         Assessment/Plan   Problem List Items Addressed This Visit        Cardiac and Vasculature    Essential hypertension - Primary    Overview     Chronic issue stable requiring medication management and monitoring. Will eat well balanced heart healthy diet, drink adequate water, increase physical activity, and get adequate rest. Monitor blood pressures daily and contact office for any readings consistently above 140/90. Patient will report any associated symptoms such as headaches, blurry vision, or nausea. Patient will go to ER for any chest pressure or chest pain.               Endocrine and Metabolic    Morbidly obese (CMS/HCC)    Overview     Chronic issue unstable requiring monitoring with lifestyle and diet changes.  Discussed weight loss and BMI plan with patient. Discussed and educated on Mediterranean diet.  Will follow a heart healthy diet low in cholesterol and fat.  Will focus on eating less refined carbohydrates and sugars and replace this with complex carbs.  Will eat smaller portion sizes and increase physical activity as tolerated.  Will drink adequate water and get adequate sleep. Patient was educated on side effects of taking Adipex medications including risk of  addiction, increased heart rate, blood pressure, anxiety, insomnia, and dry mouth. Patient verbalized understanding and wants to continue with this medication. Patient will stop medication and schedule earlier follow up if these symptoms occur. Patient will increase water intake, follow a well balanced diet low in cholesterol/carbs/sugars, decrease portion sizes, and increase physical activity.   Continue adipex.          Relevant Medications    phentermine (ADIPEX-P) 37.5 MG tablet       Musculoskeletal and Injuries    Chronic right-sided low back pain with right-sided sciatica    Overview     Chronic issue unstable requiring medication management and monitoring. Will eat well balanced diet, increase water intake, increase physical activity as tolerated, and get adequate rest. Can use warmth or ice for discomfort in this area. Discussed stretching exercises.   Start prednisone, naproxen, and Flexeril.           Other Visit Diagnoses     Rash        Relevant Medications    triamcinolone (KENALOG) 0.1 % cream             Current Outpatient Medications:   •  cyclobenzaprine (FLEXERIL) 10 MG tablet, Take 1 tablet by mouth 3 (Three) Times a Day As Needed for Muscle Spasms., Disp: 90 tablet, Rfl: 2  •  docusate sodium (COLACE) 250 MG capsule, Take 1 capsule by mouth Daily., Disp: 10 capsule, Rfl: 0  •  omeprazole (priLOSEC) 40 MG capsule, Take 1 capsule by mouth Daily., Disp: 30 capsule, Rfl: 5  •  tamsulosin (FLOMAX) 0.4 MG capsule 24 hr capsule, Take 1 capsule by mouth Daily., Disp: 90 capsule, Rfl: 3  •  triamcinolone (KENALOG) 0.1 % cream, , Disp: , Rfl:   •  phentermine (ADIPEX-P) 37.5 MG tablet, Take 1 tablet by mouth Every Morning Before Breakfast., Disp: 30 tablet, Rfl: 1       Plan of care reviewed with the patient at the conclusion of today's visit.  Education was provided regarding diagnosis, management, and any prescribed or recommended OTC medications.  Patient verbalized understanding of and agreement with  management plan.     Return in 3 months (on 12/10/2021), or if symptoms worsen or fail to improve.      Molly Ho, APRN    Please note that portions of this note were completed with a voice recognition program. Efforts were made to edit the dictations, but occasionally words are mistranscribed.

## 2021-10-14 RX ORDER — AZELASTINE 1 MG/ML
2 SPRAY, METERED NASAL 2 TIMES DAILY
Qty: 30 ML | Refills: 5 | Status: SHIPPED | OUTPATIENT
Start: 2021-10-14 | End: 2023-03-30 | Stop reason: SDUPTHER

## 2021-10-14 RX ORDER — LORATADINE 10 MG/1
10 TABLET ORAL DAILY
Qty: 30 TABLET | Refills: 5 | Status: SHIPPED | OUTPATIENT
Start: 2021-10-14 | End: 2022-05-18

## 2021-12-14 ENCOUNTER — OFFICE VISIT (OUTPATIENT)
Dept: INTERNAL MEDICINE | Facility: CLINIC | Age: 56
End: 2021-12-14

## 2021-12-14 VITALS
SYSTOLIC BLOOD PRESSURE: 146 MMHG | TEMPERATURE: 97.2 F | WEIGHT: 292.4 LBS | BODY MASS INDEX: 46.99 KG/M2 | HEART RATE: 86 BPM | DIASTOLIC BLOOD PRESSURE: 88 MMHG | HEIGHT: 66 IN | OXYGEN SATURATION: 98 %

## 2021-12-14 DIAGNOSIS — R03.0 ELEVATED BLOOD PRESSURE READING: ICD-10-CM

## 2021-12-14 DIAGNOSIS — R29.810 FACIAL WEAKNESS: Primary | ICD-10-CM

## 2021-12-14 DIAGNOSIS — G44.89 OTHER HEADACHE SYNDROME: ICD-10-CM

## 2021-12-14 PROCEDURE — 99214 OFFICE O/P EST MOD 30 MIN: CPT | Performed by: NURSE PRACTITIONER

## 2021-12-14 NOTE — PROGRESS NOTES
"Chief Complaint   Patient presents with   • Headache     facial weakness (yesterday, better today per pt) elevated bp        HPI  Bryan Tamez is a 55 y.o. male presents for follow-up.  Pt complains of a headache yesterday.  He states his wife noticed the left side of his face drooping.  He states his BP was really high.  He states the headache was intermittent all day yesterday.  \"Just didn't feel well\".  He states when he got up this morning his blood pressure had improved.  He still has a mild headache today and he can tell that he still has facial weakness on the left side.  Denies any arm or leg weakness.  He states he did not want to go to the ER yesterday.  He has taken the rest of the week off from work.  No hx of HTN.    The following portions of the patient's history were reviewed and updated as appropriate: allergies, current medications, past family history, past medical history, past social history, past surgical history and problem list.    Subjective  Review of Systems   Constitutional: Negative for activity change, appetite change and fatigue.   HENT: Negative for congestion.    Respiratory: Negative for cough and shortness of breath.    Cardiovascular: Negative for chest pain and leg swelling.   Gastrointestinal: Negative for abdominal pain.   Neurological: Positive for weakness (left side of face) and headache. Negative for dizziness, light-headedness, numbness and confusion.   Psychiatric/Behavioral: Negative for behavioral problems and decreased concentration.       Objective  Visit Vitals  /88   Pulse 86   Temp 97.2 °F (36.2 °C) (Temporal)   Ht 167.6 cm (66\")   Wt 133 kg (292 lb 6.4 oz)   SpO2 98%   BMI 47.19 kg/m²        Physical Exam  Vitals and nursing note reviewed.   Constitutional:       Appearance: He is obese.   HENT:      Head: Normocephalic.   Eyes:      Pupils: Pupils are equal, round, and reactive to light.   Cardiovascular:      Rate and Rhythm: Normal rate and regular rhythm. "      Pulses: Normal pulses.      Heart sounds: Normal heart sounds.   Pulmonary:      Effort: Pulmonary effort is normal.      Breath sounds: Normal breath sounds.   Skin:     General: Skin is warm and dry.      Capillary Refill: Capillary refill takes less than 2 seconds.   Neurological:      General: No focal deficit present.      Mental Status: He is alert and oriented to person, place, and time.      Cranial Nerves: Facial asymmetry present. No dysarthria.      Coordination: Coordination normal. Heel to Shin Test normal.      Gait: Gait is intact.      Comments: Equal , no arm or leg weakness   Psychiatric:         Attention and Perception: Attention normal.         Mood and Affect: Mood normal.         Behavior: Behavior normal.            Procedures     Assessment and Plan  Diagnoses and all orders for this visit:    1. Facial weakness (Primary)  -     CT Head Without Contrast; Future    2. Elevated blood pressure reading    3. Other headache syndrome  -     CT Head Without Contrast; Future    facial weakness noted  + HA and elevated BP  Very concerning  STAT CT ordered, awaiting scheduling, d/w Malina  Instructed to stop Adipex      Return in about 1 week (around 12/21/2021) for Recheck blood pressure and headache.          TAMAR Smart

## 2021-12-15 ENCOUNTER — HOSPITAL ENCOUNTER (OUTPATIENT)
Dept: CT IMAGING | Facility: HOSPITAL | Age: 56
Discharge: HOME | End: 2021-12-15
Admitting: NURSE PRACTITIONER

## 2021-12-15 DIAGNOSIS — R29.810 FACIAL WEAKNESS: ICD-10-CM

## 2021-12-15 DIAGNOSIS — G44.89 OTHER HEADACHE SYNDROME: ICD-10-CM

## 2021-12-15 DIAGNOSIS — R29.810 FACIAL WEAKNESS: Primary | ICD-10-CM

## 2021-12-15 PROCEDURE — 70450 CT HEAD/BRAIN W/O DYE: CPT

## 2021-12-15 RX ORDER — PREDNISONE 10 MG/1
TABLET ORAL
Qty: 21 TABLET | Refills: 0 | Status: SHIPPED | OUTPATIENT
Start: 2021-12-15 | End: 2022-04-11

## 2022-02-10 RX ORDER — AZITHROMYCIN 250 MG/1
TABLET, FILM COATED ORAL
Qty: 6 TABLET | Refills: 0 | Status: SHIPPED | OUTPATIENT
Start: 2022-02-10 | End: 2022-04-29

## 2022-02-10 RX ORDER — PREDNISONE 1 MG/1
TABLET ORAL
Qty: 21 TABLET | Refills: 0 | Status: SHIPPED | OUTPATIENT
Start: 2022-02-10 | End: 2022-04-29

## 2022-03-07 RX ORDER — DOXYCYCLINE 100 MG/1
100 CAPSULE ORAL 2 TIMES DAILY
Qty: 20 CAPSULE | Refills: 0 | Status: SHIPPED | OUTPATIENT
Start: 2022-03-07 | End: 2022-03-17

## 2022-03-28 ENCOUNTER — OFFICE VISIT (OUTPATIENT)
Dept: INTERNAL MEDICINE | Facility: CLINIC | Age: 57
End: 2022-03-28

## 2022-03-28 VITALS
HEIGHT: 66 IN | BODY MASS INDEX: 46.77 KG/M2 | DIASTOLIC BLOOD PRESSURE: 82 MMHG | SYSTOLIC BLOOD PRESSURE: 148 MMHG | WEIGHT: 291 LBS

## 2022-03-28 DIAGNOSIS — M54.42 CHRONIC LEFT-SIDED LOW BACK PAIN WITH LEFT-SIDED SCIATICA: Primary | ICD-10-CM

## 2022-03-28 DIAGNOSIS — G89.29 CHRONIC LEFT-SIDED LOW BACK PAIN WITH LEFT-SIDED SCIATICA: Primary | ICD-10-CM

## 2022-03-28 PROCEDURE — 99213 OFFICE O/P EST LOW 20 MIN: CPT | Performed by: PHYSICIAN ASSISTANT

## 2022-03-28 RX ORDER — CYCLOBENZAPRINE HCL 10 MG
10 TABLET ORAL 3 TIMES DAILY PRN
Qty: 90 TABLET | Refills: 2 | Status: SHIPPED | OUTPATIENT
Start: 2022-03-28 | End: 2022-04-07

## 2022-03-28 NOTE — PROGRESS NOTES
[facility] PRIMARY CARE  [address]  [facility] [address]  Dept: [phone]  Dept Fax: [phone]  Loc: [phone]  Loc Fax: [phone]    Bryan Tamez  1965    Follow Up Office Visit Note    History of Present Illness:  Patient is a 56-year-old male in today for chronic low back pain.  Patient had CT of the spine approximately a year ago which showed bulging disks in multiple levels.  Patient was supposed to have repeat CT in 3 to 6 months but never got this.  Patient comes in today with a several day worsening history of sharp and burning pain in his left side lower back which radiates down into his left leg.  Patient reports severe pain at times and dull and achy pain at others.  Patient rates the pain is 9 out of 10 at its worst.  Patient has been taking multiple NSAIDs and has been stretching without any relief of symptoms.      The following portions of the patient's history were reviewed and updated as appropriate: allergies, current medications, past family history, past medical history, past social history, past surgical history, and problem list.    Medications:    Current Outpatient Medications:   •  azelastine (ASTELIN) 0.1 % nasal spray, 2 sprays into the nostril(s) as directed by provider 2 (Two) Times a Day. Use in each nostril as directed, Disp: 30 mL, Rfl: 5  •  Blood Gluc Meter Disp-Strips device, 1 each 2 (Two) Times a Day., Disp: 1 each, Rfl: 0  •  glucose blood test strip, Check glucose twice daily., Disp: 100 each, Rfl: 12  •  tamsulosin (FLOMAX) 0.4 MG capsule 24 hr capsule, Take 1 capsule by mouth Daily., Disp: 90 capsule, Rfl: 3  •  azithromycin (ZITHROMAX) 250 MG tablet, Take 2 tablets the first day, then 1 tablet daily for 4 days., Disp: 6 tablet, Rfl: 0  •  cyclobenzaprine (FLEXERIL) 10 MG tablet, Take 1 tablet by mouth 3 (Three) Times a Day As Needed for Muscle Spasms., Disp: 90 tablet, Rfl: 2  •  docusate sodium (COLACE)  250 MG capsule, Take 1 capsule by mouth Daily., Disp: 10 capsule, Rfl: 0  •  loratadine (CLARITIN) 10 MG tablet, Take 1 tablet by mouth Daily., Disp: 30 tablet, Rfl: 5  •  omeprazole (priLOSEC) 40 MG capsule, Take 1 capsule by mouth Daily., Disp: 30 capsule, Rfl: 5  •  predniSONE (DELTASONE) 10 MG (21) dose pack, Use as directed on package, Disp: 21 tablet, Rfl: 0  •  predniSONE (DELTASONE) 5 MG tablet, Take as directed on package instruction - 6 day taper., Disp: 21 tablet, Rfl: 0    Subjective  No Known Allergies     Past Medical History:   Diagnosis Date   • Hyperlipemia    • Sleep apnea     does not wear CPAP       Past Surgical History:   Procedure Laterality Date   • APPENDECTOMY     • CHOLECYSTECTOMY N/A 11/23/2020    Procedure: CHOLECYSTECTOMY LAPAROSCOPIC;  Surgeon: Buddy Angel MD;  Location: [facility];  Service: General;  Laterality: N/A;   • COLONOSCOPY     • KNEE ARTHROSCOPY     • TIBIA FRACTURE SURGERY     • WRIST SURGERY         History reviewed. No pertinent family history.     Social History     Socioeconomic History   • Marital status:    Tobacco Use   • Smoking status: Current Every Day Smoker     Packs/day: 1.00     Years: 40.00     Pack years: 40.00     Types: Cigarettes     Start date: 1980   • Smokeless tobacco: Never Used   Vaping Use   • Vaping Use: Never used   Substance and Sexual Activity   • Alcohol use: Yes     Comment: rare - a few times a year    • Drug use: Never   • Sexual activity: Defer       Review of Systems   Constitutional: Negative for activity change, chills, fatigue, fever and unexpected weight change.   HENT: Negative for congestion, ear pain, postnasal drip, sinus pressure and sore throat.    Eyes: Negative for pain, discharge and redness.   Respiratory: Negative for cough, shortness of breath and wheezing.    Cardiovascular: Negative for chest pain, palpitations and leg swelling.   Gastrointestinal: Negative for diarrhea, nausea and vomiting.  "  Endocrine: Negative for cold intolerance and heat intolerance.   Genitourinary: Negative for decreased urine volume and dysuria.   Musculoskeletal: Positive for arthralgias, back pain and myalgias.   Skin: Negative for rash and wound.   Neurological: Negative for dizziness, light-headedness and headaches.   Hematological: Does not bruise/bleed easily.   Psychiatric/Behavioral: Negative for confusion, dysphoric mood and sleep disturbance. The patient is not nervous/anxious.          Objective  Vitals:    03/28/22 1033   BP: 148/82   Weight: 132 kg (291 lb)   Height: 167.4 cm (65.89\")     Body mass index is 47.13 kg/m².     Physical Exam  Physical Exam  Vitals and nursing note reviewed.   Constitutional:       General: He is not in acute distress.     Appearance: He is not ill-appearing.   HENT:      Head: Normocephalic.      Right Ear: Tympanic membrane, ear canal and external ear normal. There is no impacted cerumen.      Left Ear: Tympanic membrane, ear canal and external ear normal. There is no impacted cerumen.      Nose: No congestion or rhinorrhea.      Mouth/Throat:      Mouth: Mucous membranes are moist.      Pharynx: Oropharynx is clear. No oropharyngeal exudate or posterior oropharyngeal erythema.   Eyes:      General:         Right eye: No discharge.         Left eye: No discharge.      Extraocular Movements: Extraocular movements intact.      Conjunctiva/sclera: Conjunctivae normal.      Pupils: Pupils are equal, round, and reactive to light.   Cardiovascular:      Rate and Rhythm: Normal rate and regular rhythm.      Heart sounds: Normal heart sounds. No murmur heard.    No friction rub. No gallop.   Pulmonary:      Effort: Pulmonary effort is normal. No respiratory distress.      Breath sounds: Normal breath sounds. No wheezing.   Abdominal:      General: Bowel sounds are normal. There is no distension.      Palpations: Abdomen is soft. There is no mass.      Tenderness: There is no abdominal " tenderness.   Musculoskeletal:         General: Tenderness (To palpation over left paravertebral muscles on lumbar spine.) present. No swelling. Normal range of motion.      Cervical back: Normal range of motion. No tenderness.      Right lower leg: No edema.      Left lower leg: No edema.   Lymphadenopathy:      Cervical: No cervical adenopathy.   Skin:     Findings: No bruising, erythema or rash.   Neurological:      Mental Status: He is oriented to person, place, and time.      Gait: Gait normal.   Psychiatric:         Mood and Affect: Mood normal.         Behavior: Behavior normal.         Thought Content: Thought content normal.         Judgment: Judgment normal.         Diagnostic Data  Procedures    Assessment  Diagnoses and all orders for this visit:    1. Chronic left-sided low back pain with left-sided sciatica (Primary)  -     MRI Lumbar Spine Without Contrast; Future    Other orders  -     cyclobenzaprine (FLEXERIL) 10 MG tablet; Take 1 tablet by mouth 3 (Three) Times a Day As Needed for Muscle Spasms.  Dispense: 90 tablet; Refill: 2        Plan    1. Chronic left-sided low back pain with left- sided sciatica (Primary)-worse, sent in Flexeril 10 mg 3 times daily as needed.  Obtain MRI.      Return in about 2 weeks (around 4/11/2022) for Recheck w/ Molly..    Evan Olivera PA-C  03/28/2022

## 2022-04-05 ENCOUNTER — HOSPITAL ENCOUNTER (OUTPATIENT)
Dept: MRI IMAGING | Facility: HOSPITAL | Age: 57
Discharge: HOME | End: 2022-04-05
Admitting: PHYSICIAN ASSISTANT

## 2022-04-05 DIAGNOSIS — M54.42 CHRONIC LEFT-SIDED LOW BACK PAIN WITH LEFT-SIDED SCIATICA: ICD-10-CM

## 2022-04-05 DIAGNOSIS — G89.29 CHRONIC LEFT-SIDED LOW BACK PAIN WITH LEFT-SIDED SCIATICA: ICD-10-CM

## 2022-04-05 PROCEDURE — 72148 MRI LUMBAR SPINE W/O DYE: CPT

## 2022-04-07 RX ORDER — METHOCARBAMOL 750 MG/1
TABLET, FILM COATED ORAL
Qty: 60 TABLET | Refills: 2 | Status: SHIPPED | OUTPATIENT
Start: 2022-04-07

## 2022-04-08 DIAGNOSIS — G89.29 CHRONIC LEFT-SIDED LOW BACK PAIN WITH LEFT-SIDED SCIATICA: Primary | ICD-10-CM

## 2022-04-08 DIAGNOSIS — M54.42 CHRONIC LEFT-SIDED LOW BACK PAIN WITH LEFT-SIDED SCIATICA: Primary | ICD-10-CM

## 2022-04-08 RX ORDER — HYDROCODONE BITARTRATE AND ACETAMINOPHEN 7.5; 325 MG/1; MG/1
1 TABLET ORAL EVERY 6 HOURS PRN
Qty: 28 TABLET | Refills: 0 | Status: SHIPPED | OUTPATIENT
Start: 2022-04-08 | End: 2022-04-20 | Stop reason: SDUPTHER

## 2022-04-11 ENCOUNTER — OFFICE VISIT (OUTPATIENT)
Dept: INTERNAL MEDICINE | Facility: CLINIC | Age: 57
End: 2022-04-11

## 2022-04-11 VITALS
HEART RATE: 85 BPM | WEIGHT: 290 LBS | HEIGHT: 66 IN | RESPIRATION RATE: 18 BRPM | SYSTOLIC BLOOD PRESSURE: 148 MMHG | OXYGEN SATURATION: 96 % | TEMPERATURE: 98.3 F | BODY MASS INDEX: 46.61 KG/M2 | DIASTOLIC BLOOD PRESSURE: 84 MMHG

## 2022-04-11 DIAGNOSIS — G89.29 CHRONIC LEFT-SIDED LOW BACK PAIN WITH LEFT-SIDED SCIATICA: ICD-10-CM

## 2022-04-11 DIAGNOSIS — M54.42 CHRONIC LEFT-SIDED LOW BACK PAIN WITH LEFT-SIDED SCIATICA: ICD-10-CM

## 2022-04-11 DIAGNOSIS — R93.7 ABNORMAL MRI, LUMBAR SPINE: ICD-10-CM

## 2022-04-11 DIAGNOSIS — I10 ESSENTIAL HYPERTENSION: Primary | ICD-10-CM

## 2022-04-11 PROCEDURE — 99214 OFFICE O/P EST MOD 30 MIN: CPT | Performed by: NURSE PRACTITIONER

## 2022-04-11 RX ORDER — MELOXICAM 15 MG/1
15 TABLET ORAL DAILY
Qty: 30 TABLET | Refills: 5 | Status: SHIPPED | OUTPATIENT
Start: 2022-04-11 | End: 2022-10-20

## 2022-04-11 RX ORDER — LOSARTAN POTASSIUM 50 MG/1
50 TABLET ORAL DAILY
Qty: 30 TABLET | Refills: 1 | Status: SHIPPED | OUTPATIENT
Start: 2022-04-11 | End: 2022-07-05

## 2022-04-11 NOTE — PROGRESS NOTES
Subjective   Chief Complaint   Patient presents with   • Hypertension      Bryan Tamez is a 56 y.o. male here today for hypertension and back pain. Patient continues to have severe left lower back pain with difficulty walking and doing daily tasks. Had lumbar MRI that showed mild degenerative changes but contact of the exiting left L5 nerve root that is causing his pain. He was referred to pain management. He is agreeable to seeing neurosurgery for a consult to see if surgical intervention is an option. Muscle relaxer makes patient feel overly sedated. He is taking 1/2 tablet of hydrocodone 7.5mg every 6 hours that helps with pain but he does not want to take opioids. Patient is wants to get back to work soon. BP is elevated today and has been for last few visits. He has been prescribed anti-hypertensive meds in the past. BP waxes and wanes so he has been able to go without meds at times. He prefers to keep medications to a minimum. No shortness of breath or chest pin.     I have reviewed the following portions of the patient's history and confirmed they are accurate: allergies, current medications, past family history, past medical history, past social history, past surgical history and problem list    I have personally completed the patient's review of systems.    Review of Systems   Constitutional: Positive for fatigue. Negative for activity change, appetite change, chills, diaphoresis, fever, unexpected weight gain and unexpected weight loss.   HENT: Negative for ear discharge, ear pain, mouth sores, nosebleeds, sinus pressure, sneezing and sore throat.    Eyes: Negative for blurred vision, pain, discharge and itching.   Respiratory: Negative for cough, chest tightness, shortness of breath and wheezing.    Cardiovascular: Negative for chest pain, palpitations and leg swelling.   Gastrointestinal: Negative for abdominal pain, constipation, diarrhea, nausea, vomiting, GERD and indigestion.   Endocrine: Negative  for heat intolerance, polydipsia and polyphagia.   Genitourinary: Negative for difficulty urinating, dysuria, flank pain, frequency, hematuria and urgency.   Musculoskeletal: Positive for arthralgias, back pain, joint swelling and myalgias. Negative for gait problem, neck pain and neck stiffness.   Skin: Negative for color change, pallor and rash.   Allergic/Immunologic: Negative for immunocompromised state.   Neurological: Negative for seizures, speech difficulty, weakness, numbness and headache.   Hematological: Negative for adenopathy.   Psychiatric/Behavioral: Negative for agitation, decreased concentration, sleep disturbance, suicidal ideas and depressed mood. The patient is not nervous/anxious.        Current Outpatient Medications on File Prior to Visit   Medication Sig   • azelastine (ASTELIN) 0.1 % nasal spray 2 sprays into the nostril(s) as directed by provider 2 (Two) Times a Day. Use in each nostril as directed   • azithromycin (ZITHROMAX) 250 MG tablet Take 2 tablets the first day, then 1 tablet daily for 4 days.   • Blood Gluc Meter Disp-Strips device 1 each 2 (Two) Times a Day.   • docusate sodium (COLACE) 250 MG capsule Take 1 capsule by mouth Daily.   • glucose blood test strip Check glucose twice daily.   • HYDROcodone-acetaminophen (NORCO) 7.5-325 MG per tablet Take 1 tablet by mouth Every 6 (Six) Hours As Needed for Moderate Pain .   • loratadine (CLARITIN) 10 MG tablet Take 1 tablet by mouth Daily.   • omeprazole (priLOSEC) 40 MG capsule Take 1 capsule by mouth Daily.   • predniSONE (DELTASONE) 5 MG tablet Take as directed on package instruction - 6 day taper.   • tamsulosin (FLOMAX) 0.4 MG capsule 24 hr capsule Take 1 capsule by mouth Daily.   • [DISCONTINUED] predniSONE (DELTASONE) 10 MG (21) dose pack Use as directed on package   • methocarbamol (ROBAXIN) 750 MG tablet Take 1/2-1 tablet by mouth 4 times daily as needed for muscle spasms.     No current facility-administered medications on  "file prior to visit.       Objective   Vitals:    04/11/22 0847   BP: 148/84   Pulse: 85   Resp: 18   Temp: 98.3 °F (36.8 °C)   TempSrc: Temporal   SpO2: 96%   Weight: 132 kg (290 lb)   Height: 167.4 cm (65.89\")     Body mass index is 46.96 kg/m².    Physical Exam  Vitals reviewed.   Constitutional:       Appearance: Normal appearance. He is well-developed.   HENT:      Head: Normocephalic and atraumatic.      Nose: Nose normal.   Eyes:      General: Lids are normal.      Conjunctiva/sclera: Conjunctivae normal.      Pupils: Pupils are equal, round, and reactive to light.   Neck:      Thyroid: No thyromegaly.      Trachea: Trachea normal.   Cardiovascular:      Rate and Rhythm: Normal rate and regular rhythm.      Heart sounds: Normal heart sounds.   Pulmonary:      Effort: Pulmonary effort is normal. No respiratory distress.      Breath sounds: Normal breath sounds.   Musculoskeletal:      Lumbar back: Tenderness present. Decreased range of motion.   Skin:     General: Skin is warm and dry.   Neurological:      Mental Status: He is alert and oriented to person, place, and time.      GCS: GCS eye subscore is 4. GCS verbal subscore is 5. GCS motor subscore is 6.   Psychiatric:         Attention and Perception: Attention normal.         Mood and Affect: Mood normal.         Speech: Speech normal.         Behavior: Behavior normal. Behavior is cooperative.         Thought Content: Thought content normal.         Assessment/Plan   Problem List Items Addressed This Visit        Cardiac and Vasculature    Essential hypertension - Primary    Overview     Chronic unstable. Start losartan.          Relevant Medications    losartan (COZAAR) 50 MG tablet      Other Visit Diagnoses     Chronic left-sided low back pain with left-sided sciatica      Chronic unstable. Start meloxicam and will take with food. Refrain from other NSAIDs. Advised this can elevate BP further. Continue hydrocodone PRN. Referred to neurosurgery and pain " management      Relevant Medications    meloxicam (MOBIC) 15 MG tablet    Other Relevant Orders    Ambulatory Referral to Neurosurgery (Completed)    Abnormal MRI, lumbar spine        Relevant Orders    Ambulatory Referral to Neurosurgery (Completed)             Current Outpatient Medications:   •  azelastine (ASTELIN) 0.1 % nasal spray, 2 sprays into the nostril(s) as directed by provider 2 (Two) Times a Day. Use in each nostril as directed, Disp: 30 mL, Rfl: 5  •  azithromycin (ZITHROMAX) 250 MG tablet, Take 2 tablets the first day, then 1 tablet daily for 4 days., Disp: 6 tablet, Rfl: 0  •  Blood Gluc Meter Disp-Strips device, 1 each 2 (Two) Times a Day., Disp: 1 each, Rfl: 0  •  docusate sodium (COLACE) 250 MG capsule, Take 1 capsule by mouth Daily., Disp: 10 capsule, Rfl: 0  •  glucose blood test strip, Check glucose twice daily., Disp: 100 each, Rfl: 12  •  HYDROcodone-acetaminophen (NORCO) 7.5-325 MG per tablet, Take 1 tablet by mouth Every 6 (Six) Hours As Needed for Moderate Pain ., Disp: 28 tablet, Rfl: 0  •  loratadine (CLARITIN) 10 MG tablet, Take 1 tablet by mouth Daily., Disp: 30 tablet, Rfl: 5  •  omeprazole (priLOSEC) 40 MG capsule, Take 1 capsule by mouth Daily., Disp: 30 capsule, Rfl: 5  •  predniSONE (DELTASONE) 5 MG tablet, Take as directed on package instruction - 6 day taper., Disp: 21 tablet, Rfl: 0  •  tamsulosin (FLOMAX) 0.4 MG capsule 24 hr capsule, Take 1 capsule by mouth Daily., Disp: 90 capsule, Rfl: 3  •  losartan (COZAAR) 50 MG tablet, Take 1 tablet by mouth Daily., Disp: 30 tablet, Rfl: 1  •  meloxicam (MOBIC) 15 MG tablet, Take 1 tablet by mouth Daily. Take with food. Do not take ibuprofen, Disp: 30 tablet, Rfl: 5  •  methocarbamol (ROBAXIN) 750 MG tablet, Take 1/2-1 tablet by mouth 4 times daily as needed for muscle spasms., Disp: 60 tablet, Rfl: 2       Plan of care reviewed with the patient at the conclusion of today's visit.  Education was provided regarding diagnosis,  management, and any prescribed or recommended OTC medications.  Patient verbalized understanding of and agreement with management plan.     Return in about 2 weeks (around 4/25/2022), or if symptoms worsen or fail to improve.      Molly Ho, APRN

## 2022-04-20 DIAGNOSIS — M54.42 CHRONIC LEFT-SIDED LOW BACK PAIN WITH LEFT-SIDED SCIATICA: ICD-10-CM

## 2022-04-20 DIAGNOSIS — G89.29 CHRONIC LEFT-SIDED LOW BACK PAIN WITH LEFT-SIDED SCIATICA: ICD-10-CM

## 2022-04-20 RX ORDER — HYDROCODONE BITARTRATE AND ACETAMINOPHEN 7.5; 325 MG/1; MG/1
1 TABLET ORAL EVERY 6 HOURS PRN
Qty: 28 TABLET | Refills: 0 | Status: SHIPPED | OUTPATIENT
Start: 2022-04-20 | End: 2022-05-05 | Stop reason: SDUPTHER

## 2022-04-29 ENCOUNTER — OFFICE VISIT (OUTPATIENT)
Dept: INTERNAL MEDICINE | Facility: CLINIC | Age: 57
End: 2022-04-29

## 2022-04-29 ENCOUNTER — LAB (OUTPATIENT)
Dept: LAB | Facility: HOSPITAL | Age: 57
End: 2022-04-29

## 2022-04-29 VITALS
SYSTOLIC BLOOD PRESSURE: 134 MMHG | TEMPERATURE: 98.4 F | DIASTOLIC BLOOD PRESSURE: 84 MMHG | WEIGHT: 290 LBS | RESPIRATION RATE: 16 BRPM | HEART RATE: 78 BPM | OXYGEN SATURATION: 98 % | HEIGHT: 66 IN | BODY MASS INDEX: 46.61 KG/M2

## 2022-04-29 DIAGNOSIS — G89.29 CHRONIC RIGHT-SIDED LOW BACK PAIN WITH RIGHT-SIDED SCIATICA: ICD-10-CM

## 2022-04-29 DIAGNOSIS — Z13.21 ENCOUNTER FOR VITAMIN DEFICIENCY SCREENING: ICD-10-CM

## 2022-04-29 DIAGNOSIS — I10 ESSENTIAL HYPERTENSION: Primary | ICD-10-CM

## 2022-04-29 DIAGNOSIS — E55.9 VITAMIN D DEFICIENCY: ICD-10-CM

## 2022-04-29 DIAGNOSIS — Z13.29 THYROID DISORDER SCREENING: ICD-10-CM

## 2022-04-29 DIAGNOSIS — Z11.59 ENCOUNTER FOR HEPATITIS C SCREENING TEST FOR LOW RISK PATIENT: ICD-10-CM

## 2022-04-29 DIAGNOSIS — M54.41 CHRONIC RIGHT-SIDED LOW BACK PAIN WITH RIGHT-SIDED SCIATICA: ICD-10-CM

## 2022-04-29 DIAGNOSIS — E78.2 MIXED HYPERLIPIDEMIA: ICD-10-CM

## 2022-04-29 LAB
25(OH)D3 SERPL-MCNC: 22.7 NG/ML (ref 30–100)
ALBUMIN SERPL-MCNC: 3.9 G/DL (ref 3.5–5.2)
ALBUMIN/GLOB SERPL: 1.4 G/DL
ALP SERPL-CCNC: 97 U/L (ref 39–117)
ALT SERPL W P-5'-P-CCNC: 35 U/L (ref 1–41)
ANION GAP SERPL CALCULATED.3IONS-SCNC: 8.7 MMOL/L (ref 5–15)
AST SERPL-CCNC: 25 U/L (ref 1–40)
BILIRUB SERPL-MCNC: <0.2 MG/DL (ref 0–1.2)
BUN SERPL-MCNC: 16 MG/DL (ref 6–20)
BUN/CREAT SERPL: 13.7 (ref 7–25)
CALCIUM SPEC-SCNC: 9.1 MG/DL (ref 8.6–10.5)
CHLORIDE SERPL-SCNC: 104 MMOL/L (ref 98–107)
CHOLEST SERPL-MCNC: 287 MG/DL (ref 0–200)
CO2 SERPL-SCNC: 27.3 MMOL/L (ref 22–29)
CREAT SERPL-MCNC: 1.17 MG/DL (ref 0.76–1.27)
DEPRECATED RDW RBC AUTO: 41.8 FL (ref 37–54)
EGFRCR SERPLBLD CKD-EPI 2021: 73.2 ML/MIN/1.73
ERYTHROCYTE [DISTWIDTH] IN BLOOD BY AUTOMATED COUNT: 12.8 % (ref 12.3–15.4)
FOLATE SERPL-MCNC: 9.82 NG/ML (ref 4.78–24.2)
GLOBULIN UR ELPH-MCNC: 2.8 GM/DL
GLUCOSE SERPL-MCNC: 91 MG/DL (ref 65–99)
HCT VFR BLD AUTO: 50.1 % (ref 37.5–51)
HCV AB SER DONR QL: NORMAL
HDLC SERPL-MCNC: 43 MG/DL (ref 40–60)
HGB BLD-MCNC: 17 G/DL (ref 13–17.7)
LDLC SERPL CALC-MCNC: 211 MG/DL (ref 0–100)
LDLC/HDLC SERPL: 4.87 {RATIO}
MCH RBC QN AUTO: 30.7 PG (ref 26.6–33)
MCHC RBC AUTO-ENTMCNC: 33.9 G/DL (ref 31.5–35.7)
MCV RBC AUTO: 90.4 FL (ref 79–97)
PLATELET # BLD AUTO: 281 10*3/MM3 (ref 140–450)
PMV BLD AUTO: 10.5 FL (ref 6–12)
POTASSIUM SERPL-SCNC: 4.7 MMOL/L (ref 3.5–5.2)
PROT SERPL-MCNC: 6.7 G/DL (ref 6–8.5)
RBC # BLD AUTO: 5.54 10*6/MM3 (ref 4.14–5.8)
SODIUM SERPL-SCNC: 140 MMOL/L (ref 136–145)
TRIGL SERPL-MCNC: 173 MG/DL (ref 0–150)
TSH SERPL DL<=0.05 MIU/L-ACNC: 1.48 UIU/ML (ref 0.27–4.2)
VIT B12 BLD-MCNC: 1000 PG/ML (ref 211–946)
VLDLC SERPL-MCNC: 33 MG/DL (ref 5–40)
WBC NRBC COR # BLD: 10.84 10*3/MM3 (ref 3.4–10.8)

## 2022-04-29 PROCEDURE — 82306 VITAMIN D 25 HYDROXY: CPT | Performed by: NURSE PRACTITIONER

## 2022-04-29 PROCEDURE — 80053 COMPREHEN METABOLIC PANEL: CPT | Performed by: NURSE PRACTITIONER

## 2022-04-29 PROCEDURE — 85027 COMPLETE CBC AUTOMATED: CPT | Performed by: NURSE PRACTITIONER

## 2022-04-29 PROCEDURE — 86803 HEPATITIS C AB TEST: CPT | Performed by: NURSE PRACTITIONER

## 2022-04-29 PROCEDURE — 80061 LIPID PANEL: CPT | Performed by: NURSE PRACTITIONER

## 2022-04-29 PROCEDURE — 84443 ASSAY THYROID STIM HORMONE: CPT | Performed by: NURSE PRACTITIONER

## 2022-04-29 PROCEDURE — 82746 ASSAY OF FOLIC ACID SERUM: CPT | Performed by: NURSE PRACTITIONER

## 2022-04-29 PROCEDURE — 82607 VITAMIN B-12: CPT | Performed by: NURSE PRACTITIONER

## 2022-04-29 PROCEDURE — 99214 OFFICE O/P EST MOD 30 MIN: CPT | Performed by: NURSE PRACTITIONER

## 2022-04-29 NOTE — PROGRESS NOTES
Subjective   Chief Complaint   Patient presents with   • Hypertension      Bryan Tamez is a 56 y.o. male here today for hypertension, hyperlipidemia, and back pain. Blood pressure has improved today since starting losartan. No dizziness, shortness of breath, or chest pain. He is trying to follow a low cholesterol diet. Struggles with physical activity due to chronic pain. He has been on a statin before but discontinued this. He prefers trying more natural ways of controlling his BP and cholesterol if possible. Continues to have significant low back pain. Taking meloxicam and robaxin that help some. Occasionally has to take hydrocodone for severe pain. He has upcoming appt with neurosurgery, Dr. Niño. He prefers not to have surgery if possible.      I have reviewed the following portions of the patient's history and confirmed they are accurate: allergies, current medications, past family history, past medical history, past social history, past surgical history and problem list    I have personally completed the patient's review of systems.    Review of Systems   Constitutional: Positive for fatigue. Negative for activity change, appetite change, chills, diaphoresis, fever, unexpected weight gain and unexpected weight loss.   HENT: Negative for ear discharge, ear pain, mouth sores, nosebleeds, sinus pressure, sneezing and sore throat.    Eyes: Negative for blurred vision, pain, discharge and itching.   Respiratory: Negative for cough, chest tightness, shortness of breath and wheezing.    Cardiovascular: Negative for chest pain, palpitations and leg swelling.   Gastrointestinal: Negative for abdominal pain, constipation, diarrhea, nausea, vomiting, GERD and indigestion.   Endocrine: Negative for heat intolerance, polydipsia and polyphagia.   Genitourinary: Negative for difficulty urinating, dysuria, flank pain, frequency, hematuria and urgency.   Musculoskeletal: Positive for arthralgias, back pain, joint swelling  and myalgias. Negative for gait problem, neck pain and neck stiffness.   Skin: Negative for color change, pallor and rash.   Allergic/Immunologic: Negative for immunocompromised state.   Neurological: Negative for seizures, speech difficulty, weakness, numbness and headache.   Hematological: Negative for adenopathy.   Psychiatric/Behavioral: Negative for agitation, decreased concentration, sleep disturbance, suicidal ideas and depressed mood. The patient is not nervous/anxious.        Current Outpatient Medications on File Prior to Visit   Medication Sig   • azelastine (ASTELIN) 0.1 % nasal spray 2 sprays into the nostril(s) as directed by provider 2 (Two) Times a Day. Use in each nostril as directed   • Blood Gluc Meter Disp-Strips device 1 each 2 (Two) Times a Day.   • docusate sodium (COLACE) 250 MG capsule Take 1 capsule by mouth Daily.   • glucose blood test strip Check glucose twice daily.   • HYDROcodone-acetaminophen (NORCO) 7.5-325 MG per tablet Take 1 tablet by mouth Every 6 (Six) Hours As Needed for Moderate Pain .   • loratadine (CLARITIN) 10 MG tablet Take 1 tablet by mouth Daily.   • losartan (COZAAR) 50 MG tablet Take 1 tablet by mouth Daily.   • meloxicam (MOBIC) 15 MG tablet Take 1 tablet by mouth Daily. Take with food. Do not take ibuprofen   • methocarbamol (ROBAXIN) 750 MG tablet Take 1/2-1 tablet by mouth 4 times daily as needed for muscle spasms.   • omeprazole (priLOSEC) 40 MG capsule Take 1 capsule by mouth Daily.   • tamsulosin (FLOMAX) 0.4 MG capsule 24 hr capsule Take 1 capsule by mouth Daily.   • [DISCONTINUED] azithromycin (ZITHROMAX) 250 MG tablet Take 2 tablets the first day, then 1 tablet daily for 4 days.   • [DISCONTINUED] predniSONE (DELTASONE) 5 MG tablet Take as directed on package instruction - 6 day taper.     No current facility-administered medications on file prior to visit.       Objective   Vitals:    04/29/22 0805   BP: 134/84   Pulse: 78   Resp: 16   Temp: 98.4 °F (36.9  "°C)   TempSrc: Temporal   SpO2: 98%   Weight: 132 kg (290 lb)   Height: 167.4 cm (65.89\")     Body mass index is 46.96 kg/m².    Physical Exam  Vitals reviewed.   Constitutional:       Appearance: Normal appearance. He is well-developed.   HENT:      Head: Normocephalic and atraumatic.      Nose: Nose normal.   Eyes:      General: Lids are normal.      Conjunctiva/sclera: Conjunctivae normal.      Pupils: Pupils are equal, round, and reactive to light.   Neck:      Thyroid: No thyromegaly.      Trachea: Trachea normal.   Cardiovascular:      Rate and Rhythm: Normal rate and regular rhythm.      Heart sounds: Normal heart sounds.   Pulmonary:      Effort: Pulmonary effort is normal. No respiratory distress.      Breath sounds: Normal breath sounds.   Musculoskeletal:      Lumbar back: Tenderness present. Decreased range of motion.   Skin:     General: Skin is warm and dry.   Neurological:      Mental Status: He is alert and oriented to person, place, and time.      GCS: GCS eye subscore is 4. GCS verbal subscore is 5. GCS motor subscore is 6.   Psychiatric:         Attention and Perception: Attention normal.         Mood and Affect: Mood normal.         Speech: Speech normal.         Behavior: Behavior normal. Behavior is cooperative.         Thought Content: Thought content normal.         Assessment/Plan   Problem List Items Addressed This Visit        Cardiac and Vasculature    Essential hypertension - Primary    Overview     Chronic stable. Continue losartan. Follow a heart healthy low cholesterol diet.          Relevant Orders    CBC (No Diff)    Comprehensive Metabolic Panel    Lipid Panel    Mixed hyperlipidemia    Overview     Chronic unstable. Repeat fasting lipid panel today. Will increase fiber intake and eat heart healthy diet. Will discuss with patient if statin is needed         Relevant Orders    Lipid Panel       Endocrine and Metabolic    Vitamin D deficiency    Overview     Chronic unstable. Suggest " taking daily multivitamin and vitamin D 2,000 IU.          Relevant Orders    Vitamin D 25 Hydroxy       Musculoskeletal and Injuries    Chronic right-sided low back pain with right-sided sciatica    Overview     Chronic unstable. Continue meloxicam, robaxin, and hydrocodone PRN. Has upcoming appt with neurosurgery.            Other Visit Diagnoses     Thyroid disorder screening        Relevant Orders    TSH Rfx On Abnormal To Free T4    Encounter for vitamin deficiency screening        Relevant Orders    Vitamin B12 & Folate    Vitamin D 25 Hydroxy    Encounter for hepatitis C screening test for low risk patient        Relevant Orders    Hepatitis C Antibody             Current Outpatient Medications:   •  azelastine (ASTELIN) 0.1 % nasal spray, 2 sprays into the nostril(s) as directed by provider 2 (Two) Times a Day. Use in each nostril as directed, Disp: 30 mL, Rfl: 5  •  Blood Gluc Meter Disp-Strips device, 1 each 2 (Two) Times a Day., Disp: 1 each, Rfl: 0  •  docusate sodium (COLACE) 250 MG capsule, Take 1 capsule by mouth Daily., Disp: 10 capsule, Rfl: 0  •  glucose blood test strip, Check glucose twice daily., Disp: 100 each, Rfl: 12  •  HYDROcodone-acetaminophen (NORCO) 7.5-325 MG per tablet, Take 1 tablet by mouth Every 6 (Six) Hours As Needed for Moderate Pain ., Disp: 28 tablet, Rfl: 0  •  loratadine (CLARITIN) 10 MG tablet, Take 1 tablet by mouth Daily., Disp: 30 tablet, Rfl: 5  •  losartan (COZAAR) 50 MG tablet, Take 1 tablet by mouth Daily., Disp: 30 tablet, Rfl: 1  •  meloxicam (MOBIC) 15 MG tablet, Take 1 tablet by mouth Daily. Take with food. Do not take ibuprofen, Disp: 30 tablet, Rfl: 5  •  methocarbamol (ROBAXIN) 750 MG tablet, Take 1/2-1 tablet by mouth 4 times daily as needed for muscle spasms., Disp: 60 tablet, Rfl: 2  •  omeprazole (priLOSEC) 40 MG capsule, Take 1 capsule by mouth Daily., Disp: 30 capsule, Rfl: 5  •  tamsulosin (FLOMAX) 0.4 MG capsule 24 hr capsule, Take 1 capsule by mouth  Daily., Disp: 90 capsule, Rfl: 3       Plan of care reviewed with the patient at the conclusion of today's visit.  Education was provided regarding diagnosis, management, and any prescribed or recommended OTC medications.  Patient verbalized understanding of and agreement with management plan.     Return in about 3 months (around 7/29/2022), or if symptoms worsen or fail to improve.      Molly Ho, APRN

## 2022-05-05 DIAGNOSIS — M54.42 CHRONIC LEFT-SIDED LOW BACK PAIN WITH LEFT-SIDED SCIATICA: ICD-10-CM

## 2022-05-05 DIAGNOSIS — G89.29 CHRONIC LEFT-SIDED LOW BACK PAIN WITH LEFT-SIDED SCIATICA: ICD-10-CM

## 2022-05-05 RX ORDER — HYDROCODONE BITARTRATE AND ACETAMINOPHEN 7.5; 325 MG/1; MG/1
1 TABLET ORAL EVERY 6 HOURS PRN
Qty: 28 TABLET | Refills: 0 | Status: SHIPPED | OUTPATIENT
Start: 2022-05-05 | End: 2022-05-31 | Stop reason: SDUPTHER

## 2022-05-09 DIAGNOSIS — N40.1 BENIGN PROSTATIC HYPERPLASIA (BPH) WITH STRAINING ON URINATION: ICD-10-CM

## 2022-05-09 DIAGNOSIS — R39.16 BENIGN PROSTATIC HYPERPLASIA (BPH) WITH STRAINING ON URINATION: ICD-10-CM

## 2022-05-09 RX ORDER — TAMSULOSIN HYDROCHLORIDE 0.4 MG/1
1 CAPSULE ORAL DAILY
Qty: 90 CAPSULE | Refills: 3 | Status: SHIPPED | OUTPATIENT
Start: 2022-05-09 | End: 2023-03-08

## 2022-05-18 RX ORDER — CETIRIZINE HYDROCHLORIDE 10 MG/1
10 TABLET ORAL DAILY
Qty: 30 TABLET | Refills: 5 | Status: SHIPPED | OUTPATIENT
Start: 2022-05-18

## 2022-05-31 DIAGNOSIS — M54.42 CHRONIC LEFT-SIDED LOW BACK PAIN WITH LEFT-SIDED SCIATICA: ICD-10-CM

## 2022-05-31 DIAGNOSIS — G89.29 CHRONIC LEFT-SIDED LOW BACK PAIN WITH LEFT-SIDED SCIATICA: ICD-10-CM

## 2022-05-31 RX ORDER — HYDROCODONE BITARTRATE AND ACETAMINOPHEN 7.5; 325 MG/1; MG/1
1 TABLET ORAL EVERY 6 HOURS PRN
Qty: 28 TABLET | Refills: 0 | Status: SHIPPED | OUTPATIENT
Start: 2022-05-31 | End: 2022-10-17 | Stop reason: SDUPTHER

## 2022-06-22 ENCOUNTER — OFFICE VISIT (OUTPATIENT)
Dept: NEUROSURGERY | Facility: CLINIC | Age: 57
End: 2022-06-22

## 2022-06-22 VITALS
DIASTOLIC BLOOD PRESSURE: 82 MMHG | SYSTOLIC BLOOD PRESSURE: 144 MMHG | TEMPERATURE: 97.8 F | WEIGHT: 293.6 LBS | HEIGHT: 66 IN | BODY MASS INDEX: 47.18 KG/M2

## 2022-06-22 DIAGNOSIS — M19.90 ARTHRITIS: ICD-10-CM

## 2022-06-22 DIAGNOSIS — M54.42 CHRONIC BILATERAL LOW BACK PAIN WITH LEFT-SIDED SCIATICA: ICD-10-CM

## 2022-06-22 DIAGNOSIS — G89.29 CHRONIC BILATERAL LOW BACK PAIN WITH LEFT-SIDED SCIATICA: ICD-10-CM

## 2022-06-22 DIAGNOSIS — M51.9 LUMBOSACRAL DISC DISEASE: ICD-10-CM

## 2022-06-22 DIAGNOSIS — E66.01 MORBIDLY OBESE: Primary | ICD-10-CM

## 2022-06-22 DIAGNOSIS — M79.605 LEFT LEG PAIN: ICD-10-CM

## 2022-06-22 PROCEDURE — 99214 OFFICE O/P EST MOD 30 MIN: CPT | Performed by: PHYSICIAN ASSISTANT

## 2022-06-22 NOTE — PROGRESS NOTES
Patient: Bryan Tamez  : 1965  Chart #: [id number]    Date of Service: 2022    Chief Complaint   Patient presents with   • Back Pain   • Leg Pain     BLE    • Numbness     BLE      HPI  This is a 56-year-old gentleman who presents with back pain that has been going on for a couple of years.  He reports that walking sometimes makes his pain worse and sometimes it just is constant pain.  He also endorses left buttock hip pain that radiates into the groin and pain in the leg to the knee.  This pain is getting worse in duration and in intensity.  He denies weakness.    Chronic Illnesses:  Tobacco use 1 pack/day  Past Medical History:   Diagnosis Date   • Abnormal ECG    • Arthritis    • Cervical disc disorder    • Headache    • Hyperlipemia    • Hypertension    • Low back pain    • Lumbosacral disc disease    • Sleep apnea     does not wear CPAP         Past Surgical History:   Procedure Laterality Date   • APPENDECTOMY     • CHOLECYSTECTOMY N/A 2020    Procedure: CHOLECYSTECTOMY LAPAROSCOPIC;  Surgeon: Buddy Angel MD;  Location: [facility];  Service: General;  Laterality: N/A;   • COLONOSCOPY     • KNEE ARTHROSCOPY     • TIBIA FRACTURE SURGERY     • WRIST SURGERY         No Known Allergies      Current Outpatient Medications:   •  azelastine (ASTELIN) 0.1 % nasal spray, 2 sprays into the nostril(s) as directed by provider 2 (Two) Times a Day. Use in each nostril as directed, Disp: 30 mL, Rfl: 5  •  Blood Gluc Meter Disp-Strips device, 1 each 2 (Two) Times a Day., Disp: 1 each, Rfl: 0  •  cetirizine (zyrTEC) 10 MG tablet, Take 1 tablet by mouth Daily., Disp: 30 tablet, Rfl: 5  •  docusate sodium (COLACE) 250 MG capsule, Take 1 capsule by mouth Daily., Disp: 10 capsule, Rfl: 0  •  glucose blood test strip, Check glucose twice daily., Disp: 100 each, Rfl: 12  •  HYDROcodone-acetaminophen (NORCO) 7.5-325 MG per tablet, Take 1 tablet by mouth Every 6 (Six) Hours As Needed for Moderate Pain  "., Disp: 28 tablet, Rfl: 0  •  losartan (COZAAR) 50 MG tablet, Take 1 tablet by mouth Daily., Disp: 30 tablet, Rfl: 1  •  meloxicam (MOBIC) 15 MG tablet, Take 1 tablet by mouth Daily. Take with food. Do not take ibuprofen, Disp: 30 tablet, Rfl: 5  •  omeprazole (priLOSEC) 40 MG capsule, Take 1 capsule by mouth Daily., Disp: 30 capsule, Rfl: 5  •  tamsulosin (FLOMAX) 0.4 MG capsule 24 hr capsule, Take 1 capsule by mouth Daily., Disp: 90 capsule, Rfl: 3  •  methocarbamol (ROBAXIN) 750 MG tablet, Take 1/2-1 tablet by mouth 4 times daily as needed for muscle spasms., Disp: 60 tablet, Rfl: 2    Social History     Socioeconomic History   • Marital status:    Tobacco Use   • Smoking status: Current Every Day Smoker     Packs/day: 0.50     Years: 40.00     Pack years: 20.00     Types: Cigarettes     Start date: 1/1/1980   • Smokeless tobacco: Never Used   Vaping Use   • Vaping Use: Never used   Substance and Sexual Activity   • Alcohol use: Yes     Alcohol/week: 1.0 standard drink     Types: 1 Cans of beer per week     Comment: Very seldom   • Drug use: Never   • Sexual activity: Yes     Partners: Female     Birth control/protection: None     Comment: Only when wife feels like it.       History reviewed. No pertinent family history.    Class 3 Severe Obesity (BMI >=40). Obesity-related health conditions include the following: osteoarthritis. Obesity is unchanged. BMI is is above average; BMI management plan is completed.      Social History    Tobacco Use      Smoking status: Current Every Day Smoker        Packs/day: 0.50        Years: 40.00        Pack years: 20        Types: Cigarettes        Start date: 1/1/1980      Smokeless tobacco: Never Used       Physical examination:  Blood pressure 144/82, temperature 97.8 °F (36.6 °C), temperature source Infrared, height 167.6 cm (65.98\"), weight 133 kg (293 lb 9.6 oz).  HEENT- normocephalic, atraumatic, sclera clear  Lungs-normal expansion, no wheezing  Heart-regular " rate and rhythm  Extremities-positive pulses, no edema    Neurologic Exam  WDWNWM  A/A/C, speech clear, attention normal, conversant, answers questions appropriately, good historian.  Cranial nerves II through XII are intact.  Motor examination does not reveal weakness in  lower extremities. SLR is -  Sensation is intact.  Gait is normal, balance is normal.   No tremors are noted.  Reflexes are intact.   Palpation of the lumbar spine reveals tenderness.    Radiographic Imaging:  For my review is a lumbar MRI which reveals mild DDD at L3-4 and L4-5.        Medical Decision Making  Diagnoses and all orders for this visit:    1. Morbidly obese (HCC) (Primary)    2. Arthritis    3. Chronic bilateral low back pain with left-sided sciatica    4. Left leg pain    5. Lumbosacral disc disease       Conservative treatment with PT, proper posture with activities. If his symptoms were to persist I would recommend xrays of the lumbar spine with flexion and extension views to evaluate for facet instability that could be involved with back pain. Referral to Pain management for injection would be an option. He needs smoking cessation and weight loss.  It was a pleasure providing neurosurgical evaluation.     Roxie Mcwilliams, PAC    Patient Care Team:  Molly Briceño APRN as PCP - General (Nurse Practitioner)

## 2022-06-30 PROBLEM — M54.50 LOW BACK PAIN: Status: ACTIVE | Noted: 2020-09-25

## 2022-07-05 RX ORDER — LOSARTAN POTASSIUM 50 MG/1
TABLET ORAL
Qty: 30 TABLET | Refills: 1 | Status: SHIPPED | OUTPATIENT
Start: 2022-07-05 | End: 2022-07-29

## 2022-07-29 ENCOUNTER — OFFICE VISIT (OUTPATIENT)
Dept: INTERNAL MEDICINE | Facility: CLINIC | Age: 57
End: 2022-07-29

## 2022-07-29 VITALS
DIASTOLIC BLOOD PRESSURE: 88 MMHG | HEIGHT: 66 IN | BODY MASS INDEX: 47.73 KG/M2 | WEIGHT: 297 LBS | OXYGEN SATURATION: 96 % | SYSTOLIC BLOOD PRESSURE: 142 MMHG | TEMPERATURE: 97.1 F | HEART RATE: 87 BPM

## 2022-07-29 DIAGNOSIS — E78.2 MIXED HYPERLIPIDEMIA: ICD-10-CM

## 2022-07-29 DIAGNOSIS — I10 ESSENTIAL HYPERTENSION: Primary | ICD-10-CM

## 2022-07-29 DIAGNOSIS — R07.89 OTHER CHEST PAIN: ICD-10-CM

## 2022-07-29 PROCEDURE — 99214 OFFICE O/P EST MOD 30 MIN: CPT | Performed by: NURSE PRACTITIONER

## 2022-07-29 RX ORDER — EZETIMIBE 10 MG/1
10 TABLET ORAL DAILY
Qty: 90 TABLET | Refills: 1 | Status: SHIPPED | OUTPATIENT
Start: 2022-07-29 | End: 2023-01-23

## 2022-07-29 RX ORDER — LOSARTAN POTASSIUM 100 MG/1
100 TABLET ORAL DAILY
Qty: 30 TABLET | Refills: 5 | Status: SHIPPED | OUTPATIENT
Start: 2022-07-29 | End: 2023-02-20

## 2022-08-01 RX ORDER — PREDNISONE 1 MG/1
TABLET ORAL
Qty: 21 TABLET | Refills: 0 | Status: SHIPPED | OUTPATIENT
Start: 2022-08-01 | End: 2022-10-20

## 2022-08-01 RX ORDER — AZITHROMYCIN 250 MG/1
TABLET, FILM COATED ORAL
Qty: 6 TABLET | Refills: 0 | Status: SHIPPED | OUTPATIENT
Start: 2022-08-01 | End: 2022-10-20

## 2022-08-16 ENCOUNTER — TELEPHONE (OUTPATIENT)
Dept: INTERNAL MEDICINE | Facility: CLINIC | Age: 57
End: 2022-08-16

## 2022-08-16 ENCOUNTER — OFFICE VISIT (OUTPATIENT)
Dept: INTERNAL MEDICINE | Facility: CLINIC | Age: 57
End: 2022-08-16

## 2022-08-16 VITALS
HEART RATE: 90 BPM | OXYGEN SATURATION: 99 % | TEMPERATURE: 97.2 F | SYSTOLIC BLOOD PRESSURE: 142 MMHG | HEIGHT: 66 IN | DIASTOLIC BLOOD PRESSURE: 88 MMHG | RESPIRATION RATE: 16 BRPM | BODY MASS INDEX: 49.82 KG/M2 | WEIGHT: 310 LBS

## 2022-08-16 DIAGNOSIS — R06.02 SHORTNESS OF BREATH: ICD-10-CM

## 2022-08-16 DIAGNOSIS — R82.90 FOUL SMELLING URINE: ICD-10-CM

## 2022-08-16 DIAGNOSIS — I10 ESSENTIAL HYPERTENSION: Primary | ICD-10-CM

## 2022-08-16 DIAGNOSIS — E78.2 MIXED HYPERLIPIDEMIA: ICD-10-CM

## 2022-08-16 PROCEDURE — 99214 OFFICE O/P EST MOD 30 MIN: CPT | Performed by: NURSE PRACTITIONER

## 2022-08-16 NOTE — PROGRESS NOTES
"Subjective   Chief Complaint   Patient presents with   • Hypertension      Bryan Tamez is a 56 y.o. male.     The patient is here today for hypertension. He is accompanied by an adult male.     Hypertension- The adult male states the patient's blood pressure is approximately the same compared to his last visit. His systolic numbers averages in the 140s.     Chest pain- He complains of left sided chest pain, which is concerning for a cardiac issue. He notes difficulty taking a deep breath when he is experiences this pain. He denies right sided chest pain. Once he is able to get a good deep breath, his symptoms improve. He has seen cardio in the past; however, a cardiology referral was placed on 07/29/2022 to Dr. Buddy Beasley, but the patient has not received a phone call from his office to schedule an appointment. He states he will experience a cough while lying on his left side and he denies experiencing a cough while lying on his right side. He has not completed a chest x-ray. He declined an EKG during his last appointment on 07/29/2022.    Urine odor- The patient complains of a foul urine odor that has been present for \"a while\". He frequently drinks Monster tea energy drinks and does not drink an adequate amount of water.     Sinuses- The patient was feeling ill around 08/01/2022. He states his symptoms have improved. He also states the medicine was beneficial for a brief period.     Weight- He is requesting to be placed back on appetite suppressant medication, which I recommend he discuss with Dr. Beasley.    Medication- The patient has previous noncompliance issues with medications. He does not like taking medications. In the past, he has not tolerated statins well. He was started on Zetia on 07/29/2022 and is doing well with the medication. He has also been on multiple blood pressure medications in the past that were not beneficial for improving his blood pressure.    I have reviewed the following portions " of the patient's history and confirmed they are accurate: allergies, current medications, past family history, past medical history, past social history, past surgical history, and problem list    I have personally completed the patient's review of systems.    Review of Systems   Constitutional: Positive for fatigue. Negative for activity change, appetite change, chills, diaphoresis, fever, unexpected weight gain and unexpected weight loss.   HENT: Negative for ear discharge, ear pain, mouth sores, nosebleeds, sinus pressure, sneezing and sore throat.    Eyes: Negative for blurred vision, pain, discharge and itching.   Respiratory: Positive for chest tightness. Negative for cough, shortness of breath and wheezing.    Cardiovascular: Positive for chest pain and leg swelling. Negative for palpitations.   Gastrointestinal: Negative for abdominal pain, constipation, diarrhea, nausea, vomiting, GERD and indigestion.   Endocrine: Negative for heat intolerance, polydipsia and polyphagia.   Genitourinary: Negative for difficulty urinating, dysuria, flank pain, frequency, hematuria and urgency.   Musculoskeletal: Positive for arthralgias, back pain, joint swelling and myalgias. Negative for gait problem, neck pain and neck stiffness.   Skin: Negative for color change, pallor and rash.   Allergic/Immunologic: Negative for immunocompromised state.   Neurological: Negative for seizures, speech difficulty, weakness, light-headedness, numbness and headache.   Hematological: Negative for adenopathy.   Psychiatric/Behavioral: Negative for agitation, decreased concentration, sleep disturbance, suicidal ideas and depressed mood. The patient is not nervous/anxious.        Current Outpatient Medications on File Prior to Visit   Medication Sig   • azelastine (ASTELIN) 0.1 % nasal spray 2 sprays into the nostril(s) as directed by provider 2 (Two) Times a Day. Use in each nostril as directed   • azithromycin (ZITHROMAX) 250 MG tablet Take  "2 tablets the first day, then 1 tablet daily for 4 days.   • Blood Gluc Meter Disp-Strips device 1 each 2 (Two) Times a Day.   • cetirizine (zyrTEC) 10 MG tablet Take 1 tablet by mouth Daily.   • docusate sodium (COLACE) 250 MG capsule Take 1 capsule by mouth Daily.   • ezetimibe (Zetia) 10 MG tablet Take 1 tablet by mouth Daily.   • glucose blood test strip Check glucose twice daily.   • HYDROcodone-acetaminophen (NORCO) 7.5-325 MG per tablet Take 1 tablet by mouth Every 6 (Six) Hours As Needed for Moderate Pain .   • losartan (COZAAR) 100 MG tablet Take 1 tablet by mouth Daily.   • meloxicam (MOBIC) 15 MG tablet Take 1 tablet by mouth Daily. Take with food. Do not take ibuprofen   • methocarbamol (ROBAXIN) 750 MG tablet Take 1/2-1 tablet by mouth 4 times daily as needed for muscle spasms.   • omeprazole (priLOSEC) 40 MG capsule Take 1 capsule by mouth Daily.   • predniSONE (DELTASONE) 5 MG tablet Take as directed on package instruction - 6 day taper.   • tamsulosin (FLOMAX) 0.4 MG capsule 24 hr capsule Take 1 capsule by mouth Daily.     No current facility-administered medications on file prior to visit.       Objective   Vitals:    08/16/22 1019   BP: 142/88   Pulse: 90   Resp: 16   Temp: 97.2 °F (36.2 °C)   SpO2: 99%   Weight: (!) 141 kg (310 lb)   Height: 167.6 cm (66\")   PainSc: 0-No pain     Body mass index is 50.04 kg/m².    Physical Exam  Vitals reviewed.   Constitutional:       Appearance: Normal appearance. He is well-developed.   HENT:      Head: Normocephalic and atraumatic.      Nose: Nose normal.   Eyes:      General: Lids are normal.      Conjunctiva/sclera: Conjunctivae normal.      Pupils: Pupils are equal, round, and reactive to light.   Neck:      Thyroid: No thyromegaly.      Trachea: Trachea normal.   Cardiovascular:      Rate and Rhythm: Normal rate and regular rhythm.      Heart sounds: Normal heart sounds.   Pulmonary:      Effort: Pulmonary effort is normal. No respiratory distress.      " Breath sounds: Normal breath sounds.   Musculoskeletal:      Lumbar back: Tenderness present. Decreased range of motion.   Skin:     General: Skin is warm and dry.   Neurological:      Mental Status: He is alert and oriented to person, place, and time.      GCS: GCS eye subscore is 4. GCS verbal subscore is 5. GCS motor subscore is 6.   Psychiatric:         Attention and Perception: Attention normal.         Mood and Affect: Mood normal.         Speech: Speech normal.         Behavior: Behavior normal. Behavior is cooperative.         Thought Content: Thought content normal.         Assessment & Plan   Problem List Items Addressed This Visit        Cardiac and Vasculature    Essential hypertension - Primary    Mixed hyperlipidemia      Other Visit Diagnoses     Shortness of breath        Relevant Orders    XR Chest 2 View       1. Hypertension  - Chronic, unstable.   - Continue losartan.   - The patient declines to have any additional blood pressure medications added to his current medication regimen today.  - He has been referred to cardiologist, Dr. Beasley for consult.   - The patient will go to the emergency room for any worsening shortness of breath or chest pain.  - Chest x-ray ordered to further assess his shortness of breath.    2. Hyperlipidemia  - Chronic, unstable.   - Continue Zetia.   - The patient will repeat fasting lipid panel at next follow-up in 3 months.     3. Shortness of breath  - New, unstable.  - Completing chest x-ray.  - Consider a CT scan of chest.  - Referred to cardiology for consultation to see if this is related to cardiac issue.    4. Foul smelling urine  - The patient left the office today without providing a urine sample.  - The clinical staff will contact him and have him come by for UA.  - He will increase his water intake.        Current Outpatient Medications:   •  azelastine (ASTELIN) 0.1 % nasal spray, 2 sprays into the nostril(s) as directed by provider 2 (Two) Times a Day.  Use in each nostril as directed, Disp: 30 mL, Rfl: 5  •  azithromycin (ZITHROMAX) 250 MG tablet, Take 2 tablets the first day, then 1 tablet daily for 4 days., Disp: 6 tablet, Rfl: 0  •  Blood Gluc Meter Disp-Strips device, 1 each 2 (Two) Times a Day., Disp: 1 each, Rfl: 0  •  cetirizine (zyrTEC) 10 MG tablet, Take 1 tablet by mouth Daily., Disp: 30 tablet, Rfl: 5  •  docusate sodium (COLACE) 250 MG capsule, Take 1 capsule by mouth Daily., Disp: 10 capsule, Rfl: 0  •  ezetimibe (Zetia) 10 MG tablet, Take 1 tablet by mouth Daily., Disp: 90 tablet, Rfl: 1  •  glucose blood test strip, Check glucose twice daily., Disp: 100 each, Rfl: 12  •  HYDROcodone-acetaminophen (NORCO) 7.5-325 MG per tablet, Take 1 tablet by mouth Every 6 (Six) Hours As Needed for Moderate Pain ., Disp: 28 tablet, Rfl: 0  •  losartan (COZAAR) 100 MG tablet, Take 1 tablet by mouth Daily., Disp: 30 tablet, Rfl: 5  •  meloxicam (MOBIC) 15 MG tablet, Take 1 tablet by mouth Daily. Take with food. Do not take ibuprofen, Disp: 30 tablet, Rfl: 5  •  methocarbamol (ROBAXIN) 750 MG tablet, Take 1/2-1 tablet by mouth 4 times daily as needed for muscle spasms., Disp: 60 tablet, Rfl: 2  •  omeprazole (priLOSEC) 40 MG capsule, Take 1 capsule by mouth Daily., Disp: 30 capsule, Rfl: 5  •  predniSONE (DELTASONE) 5 MG tablet, Take as directed on package instruction - 6 day taper., Disp: 21 tablet, Rfl: 0  •  tamsulosin (FLOMAX) 0.4 MG capsule 24 hr capsule, Take 1 capsule by mouth Daily., Disp: 90 capsule, Rfl: 3       Plan of care reviewed with the patient at the conclusion of today's visit.  Education was provided regarding diagnosis, management, and any prescribed or recommended OTC medications.  Patient verbalized understanding of and agreement with management plan.     Return in about 3 months (around 11/16/2022), or if symptoms worsen or fail to improve.      Transcribed from ambient dictation for Molly Ho, APRN by DEBBIE LILLY.  08/16/22    12:37 EDT    Patient verbalized consent to the visit recording.

## 2022-08-16 NOTE — TELEPHONE ENCOUNTER
Patient left today without doing urine sample for UA. Please ask him if he is in the area if he can come back by in the future and leave this. thanks

## 2022-08-16 NOTE — TELEPHONE ENCOUNTER
Patient was referred to cardiology, Dr. Beasley's office on 7/29 and referral states info was faxed to them. He has not heard back on appt and is having chest pain. Can you please call them and ask they get him appt asap. Thank you.

## 2022-09-07 ENCOUNTER — LAB (OUTPATIENT)
Dept: LAB | Facility: HOSPITAL | Age: 57
End: 2022-09-07

## 2022-09-07 ENCOUNTER — HOSPITAL ENCOUNTER (OUTPATIENT)
Dept: GENERAL RADIOLOGY | Facility: HOSPITAL | Age: 57
Discharge: HOME | End: 2022-09-07

## 2022-09-07 DIAGNOSIS — J43.9 PULMONARY EMPHYSEMA, UNSPECIFIED EMPHYSEMA TYPE: Primary | ICD-10-CM

## 2022-09-07 DIAGNOSIS — R06.02 SHORTNESS OF BREATH: ICD-10-CM

## 2022-09-07 DIAGNOSIS — R82.90 FOUL SMELLING URINE: ICD-10-CM

## 2022-09-07 PROCEDURE — 71046 X-RAY EXAM CHEST 2 VIEWS: CPT

## 2022-09-07 RX ORDER — TIOTROPIUM BROMIDE INHALATION SPRAY 3.12 UG/1
2 SPRAY, METERED RESPIRATORY (INHALATION)
Qty: 4 G | Refills: 5 | Status: SHIPPED | OUTPATIENT
Start: 2022-09-07

## 2022-09-07 RX ORDER — ALBUTEROL SULFATE 90 UG/1
2 AEROSOL, METERED RESPIRATORY (INHALATION) EVERY 4 HOURS PRN
Qty: 18 G | Refills: 0 | Status: SHIPPED | OUTPATIENT
Start: 2022-09-07

## 2022-09-12 DIAGNOSIS — F17.210 CIGARETTE SMOKER: Primary | ICD-10-CM

## 2022-09-12 DIAGNOSIS — R93.89 ABNORMAL X-RAY: ICD-10-CM

## 2022-09-12 DIAGNOSIS — R06.02 SHORTNESS OF BREATH: ICD-10-CM

## 2022-09-12 DIAGNOSIS — R05.9 COUGH: ICD-10-CM

## 2022-09-12 RX ORDER — OFLOXACIN 3 MG/ML
2 SOLUTION/ DROPS OPHTHALMIC 4 TIMES DAILY
Qty: 5 ML | Refills: 0 | Status: SHIPPED | OUTPATIENT
Start: 2022-09-12 | End: 2022-09-19

## 2022-09-19 ENCOUNTER — HOSPITAL ENCOUNTER (OUTPATIENT)
Dept: CT IMAGING | Facility: HOSPITAL | Age: 57
Discharge: HOME | End: 2022-09-19
Admitting: NURSE PRACTITIONER

## 2022-09-19 DIAGNOSIS — F17.210 CIGARETTE SMOKER: ICD-10-CM

## 2022-09-19 DIAGNOSIS — R93.89 ABNORMAL X-RAY: ICD-10-CM

## 2022-09-19 DIAGNOSIS — R05.9 COUGH: ICD-10-CM

## 2022-09-19 DIAGNOSIS — R06.02 SHORTNESS OF BREATH: ICD-10-CM

## 2022-09-19 LAB — CREAT BLDA-MCNC: 1.1 MG/DL (ref 0.6–1.3)

## 2022-09-19 PROCEDURE — 71260 CT THORAX DX C+: CPT

## 2022-09-19 PROCEDURE — 25010000002 IOPAMIDOL 61 % SOLUTION: Performed by: NURSE PRACTITIONER

## 2022-09-19 PROCEDURE — 82565 ASSAY OF CREATININE: CPT

## 2022-09-19 RX ADMIN — IOPAMIDOL 90 ML: 612 INJECTION, SOLUTION INTRAVENOUS at 16:55

## 2022-10-17 DIAGNOSIS — M54.42 CHRONIC LEFT-SIDED LOW BACK PAIN WITH LEFT-SIDED SCIATICA: ICD-10-CM

## 2022-10-17 DIAGNOSIS — G89.29 CHRONIC LEFT-SIDED LOW BACK PAIN WITH LEFT-SIDED SCIATICA: ICD-10-CM

## 2022-10-17 RX ORDER — HYDROCODONE BITARTRATE AND ACETAMINOPHEN 7.5; 325 MG/1; MG/1
1 TABLET ORAL EVERY 6 HOURS PRN
Qty: 28 TABLET | Refills: 0 | Status: SHIPPED | OUTPATIENT
Start: 2022-10-17 | End: 2022-11-03 | Stop reason: SDUPTHER

## 2022-10-20 ENCOUNTER — OFFICE VISIT (OUTPATIENT)
Dept: INTERNAL MEDICINE | Facility: CLINIC | Age: 57
End: 2022-10-20

## 2022-10-20 VITALS
HEIGHT: 65 IN | WEIGHT: 308 LBS | OXYGEN SATURATION: 99 % | HEART RATE: 87 BPM | BODY MASS INDEX: 51.32 KG/M2 | DIASTOLIC BLOOD PRESSURE: 78 MMHG | TEMPERATURE: 96.5 F | SYSTOLIC BLOOD PRESSURE: 132 MMHG

## 2022-10-20 DIAGNOSIS — R93.7 ABNORMAL MRI, LUMBAR SPINE: ICD-10-CM

## 2022-10-20 DIAGNOSIS — M54.41 CHRONIC RIGHT-SIDED LOW BACK PAIN WITH RIGHT-SIDED SCIATICA: Primary | ICD-10-CM

## 2022-10-20 DIAGNOSIS — G89.29 CHRONIC RIGHT-SIDED LOW BACK PAIN WITH RIGHT-SIDED SCIATICA: Primary | ICD-10-CM

## 2022-10-20 DIAGNOSIS — I10 ESSENTIAL HYPERTENSION: ICD-10-CM

## 2022-10-20 PROCEDURE — 99214 OFFICE O/P EST MOD 30 MIN: CPT | Performed by: NURSE PRACTITIONER

## 2022-10-20 RX ORDER — KETOROLAC TROMETHAMINE 30 MG/ML
60 INJECTION, SOLUTION INTRAMUSCULAR; INTRAVENOUS ONCE
Status: SHIPPED | OUTPATIENT
Start: 2022-10-20 | End: 2022-10-25

## 2022-10-20 RX ORDER — TRIAMCINOLONE ACETONIDE 40 MG/ML
40 INJECTION, SUSPENSION INTRA-ARTICULAR; INTRAMUSCULAR ONCE
Status: SHIPPED | OUTPATIENT
Start: 2022-10-20

## 2022-10-20 RX ORDER — PREDNISONE 10 MG/1
TABLET ORAL
Qty: 21 TABLET | Refills: 0 | Status: SHIPPED | OUTPATIENT
Start: 2022-10-20

## 2022-10-20 NOTE — PROGRESS NOTES
"Chief Complaint   Patient presents with   • Back Pain       HPI  Bryan Tamez is a 56 y.o. male presents for chronic low back pain.  Treatment includes Hydrocodone.   Pt states that he's unable to sit because the pain is so severe when he tries to stand.  The pain radiates down his right leg.  The pain is getting worse.  He had an MRI other this year.  He was referred to a neurosurgeon but surgical intervention was not an option.  He is requesting referral to Dr Villasenor in [place].      The following portions of the patient's history were reviewed and updated as appropriate: allergies, current medications, past family history, past medical history, past social history, past surgical history and problem list.    Subjective  Review of Systems   Constitutional: Negative for activity change, appetite change and fatigue.   HENT: Negative for congestion.    Respiratory: Negative for cough and shortness of breath.    Cardiovascular: Negative for chest pain and leg swelling.   Gastrointestinal: Negative for abdominal pain.   Musculoskeletal: Positive for back pain.   Neurological: Negative for dizziness, weakness and confusion.   Psychiatric/Behavioral: Negative for behavioral problems and decreased concentration.       Objective  Visit Vitals  /78 (BP Location: Left arm, Patient Position: Sitting)   Pulse 87   Temp 96.5 °F (35.8 °C)   Ht 165.1 cm (65\")   Wt (!) 140 kg (308 lb)   SpO2 99%   BMI 51.25 kg/m²        Physical Exam  Vitals and nursing note reviewed.   HENT:      Head: Normocephalic.   Eyes:      Pupils: Pupils are equal, round, and reactive to light.   Pulmonary:      Effort: Pulmonary effort is normal.   Musculoskeletal:      Lumbar back: Tenderness present. No swelling or spasms. Decreased range of motion.   Skin:     General: Skin is warm and dry.      Capillary Refill: Capillary refill takes less than 2 seconds.   Neurological:      General: No focal deficit present.      Mental Status: He is alert and " oriented to person, place, and time.      Gait: Gait is intact.   Psychiatric:         Attention and Perception: Attention normal.         Mood and Affect: Mood normal.         Behavior: Behavior normal.          Procedures     Assessment and Plan  Diagnoses and all orders for this visit:    1. Chronic right-sided low back pain with right-sided sciatica (Primary)  -     Ambulatory Referral to Orthopedic Surgery  -     triamcinolone acetonide (KENALOG-40) injection 40 mg  -     ketorolac (TORADOL) injection 60 mg  -     predniSONE (DELTASONE) 10 MG (21) dose pack; Use as directed on package  Dispense: 21 tablet; Refill: 0    2. Abnormal MRI, lumbar spine    3. Essential hypertension      MRI in chart  Refer to Dr Villasenor per pt request  Cont prn Hydrocodone  Start oral steroid jessenia tomor  BP well controlled at this time    Return in about 4 weeks (around 11/17/2022) for Medicare Wellness.         TAMAR Smart

## 2022-10-26 DIAGNOSIS — G89.29 CHRONIC LEFT-SIDED LOW BACK PAIN WITH LEFT-SIDED SCIATICA: ICD-10-CM

## 2022-10-26 DIAGNOSIS — M54.42 CHRONIC LEFT-SIDED LOW BACK PAIN WITH LEFT-SIDED SCIATICA: ICD-10-CM

## 2022-10-26 RX ORDER — HYDROCODONE BITARTRATE AND ACETAMINOPHEN 7.5; 325 MG/1; MG/1
1 TABLET ORAL EVERY 6 HOURS PRN
Qty: 28 TABLET | Refills: 0 | OUTPATIENT
Start: 2022-10-26

## 2022-11-03 DIAGNOSIS — M54.42 CHRONIC LEFT-SIDED LOW BACK PAIN WITH LEFT-SIDED SCIATICA: ICD-10-CM

## 2022-11-03 DIAGNOSIS — G89.29 CHRONIC LEFT-SIDED LOW BACK PAIN WITH LEFT-SIDED SCIATICA: ICD-10-CM

## 2022-11-03 RX ORDER — HYDROCODONE BITARTRATE AND ACETAMINOPHEN 7.5; 325 MG/1; MG/1
1 TABLET ORAL EVERY 6 HOURS PRN
Qty: 28 TABLET | Refills: 0 | Status: SHIPPED | OUTPATIENT
Start: 2022-11-03 | End: 2022-12-12 | Stop reason: SDUPTHER

## 2022-12-12 ENCOUNTER — APPOINTMENT (OUTPATIENT)
Dept: CARDIOLOGY | Facility: HOSPITAL | Age: 57
End: 2022-12-12

## 2022-12-12 ENCOUNTER — OFFICE VISIT (OUTPATIENT)
Dept: INTERNAL MEDICINE | Facility: CLINIC | Age: 57
End: 2022-12-12

## 2022-12-12 VITALS
HEIGHT: 65 IN | SYSTOLIC BLOOD PRESSURE: 132 MMHG | TEMPERATURE: 98.2 F | RESPIRATION RATE: 16 BRPM | WEIGHT: 302 LBS | OXYGEN SATURATION: 99 % | BODY MASS INDEX: 50.31 KG/M2 | DIASTOLIC BLOOD PRESSURE: 78 MMHG | HEART RATE: 85 BPM

## 2022-12-12 DIAGNOSIS — L81.9 DISCOLORATION OF SKIN OF LOWER LEG: ICD-10-CM

## 2022-12-12 DIAGNOSIS — M54.42 CHRONIC LEFT-SIDED LOW BACK PAIN WITH LEFT-SIDED SCIATICA: ICD-10-CM

## 2022-12-12 DIAGNOSIS — M79.89 LEFT LEG SWELLING: Primary | ICD-10-CM

## 2022-12-12 DIAGNOSIS — G89.29 CHRONIC LEFT-SIDED LOW BACK PAIN WITH LEFT-SIDED SCIATICA: ICD-10-CM

## 2022-12-12 PROCEDURE — 99214 OFFICE O/P EST MOD 30 MIN: CPT | Performed by: NURSE PRACTITIONER

## 2022-12-12 RX ORDER — HYDROCODONE BITARTRATE AND ACETAMINOPHEN 7.5; 325 MG/1; MG/1
1 TABLET ORAL EVERY 6 HOURS PRN
Qty: 28 TABLET | Refills: 0 | Status: SHIPPED | OUTPATIENT
Start: 2022-12-12 | End: 2023-03-30 | Stop reason: SDUPTHER

## 2022-12-12 NOTE — PROGRESS NOTES
Subjective   Chief Complaint   Patient presents with   • Knee Pain     Behind L knee      Bryan Tamez is a 56 y.o. male.     The patient is here today for pain behind left knee.    Left leg swelling  The patient reports a dark spot on the posterior aspect of his left knee that has been present for over a week. He states that it is painful and sore. He denies any injury or trauma. He states that the overall size of his left leg has not changed. He states that his left leg swells up at night.    Chronic left-sided low back pain  The patient states that he needs a refill of his hydrocodone. He states that it is improving his symptoms. He states that he takes a pill every 6 hours as needed. He states that when his left leg starts to be painful, he takes half a pill 2 to 3 times a day. Anti-inflammatory and muscle relaxer did not improve the patient's back much in the past.      I have reviewed the following portions of the patient's history and confirmed they are accurate: allergies, current medications, past family history, past medical history, past social history, past surgical history, and problem list    I have personally completed the patient's review of systems.    Review of Systems   Constitutional: Positive for fatigue. Negative for activity change, appetite change, chills, diaphoresis, fever, unexpected weight gain and unexpected weight loss.   HENT: Negative for ear discharge, ear pain, mouth sores, nosebleeds, sinus pressure, sneezing and sore throat.    Eyes: Negative for blurred vision, pain, discharge and itching.   Respiratory: Negative for cough, shortness of breath and wheezing.    Cardiovascular: Positive for leg swelling.   Gastrointestinal: Negative for abdominal pain, constipation, diarrhea, nausea, vomiting, GERD and indigestion.   Endocrine: Negative for heat intolerance, polydipsia and polyphagia.   Genitourinary: Negative for difficulty urinating, dysuria, flank pain, frequency, hematuria and  urgency.   Musculoskeletal: Positive for arthralgias, back pain, joint swelling and myalgias. Negative for gait problem, neck pain and neck stiffness.   Skin: Positive for color change and bruise. Negative for pallor and rash.   Allergic/Immunologic: Negative for immunocompromised state.   Neurological: Negative for seizures, speech difficulty, weakness, light-headedness, numbness and headache.   Hematological: Negative for adenopathy.   Psychiatric/Behavioral: Negative for agitation, decreased concentration, sleep disturbance, suicidal ideas and depressed mood. The patient is not nervous/anxious.        Current Outpatient Medications on File Prior to Visit   Medication Sig   • albuterol sulfate  (90 Base) MCG/ACT inhaler Inhale 2 puffs Every 4 (Four) Hours As Needed for Wheezing or Shortness of Air. AS NEEDED   • azelastine (ASTELIN) 0.1 % nasal spray 2 sprays into the nostril(s) as directed by provider 2 (Two) Times a Day. Use in each nostril as directed   • cetirizine (zyrTEC) 10 MG tablet Take 1 tablet by mouth Daily.   • ezetimibe (Zetia) 10 MG tablet Take 1 tablet by mouth Daily.   • losartan (COZAAR) 100 MG tablet Take 1 tablet by mouth Daily.   • methocarbamol (ROBAXIN) 750 MG tablet Take 1/2-1 tablet by mouth 4 times daily as needed for muscle spasms.   • tamsulosin (FLOMAX) 0.4 MG capsule 24 hr capsule Take 1 capsule by mouth Daily.   • tiotropium bromide monohydrate (Spiriva Respimat) 2.5 MCG/ACT aerosol solution inhaler Inhale 2 puffs Daily. DAILY   • [DISCONTINUED] HYDROcodone-acetaminophen (NORCO) 7.5-325 MG per tablet Take 1 tablet by mouth Every 6 (Six) Hours As Needed for Moderate Pain.   • Blood Gluc Meter Disp-Strips device 1 each 2 (Two) Times a Day.   • docusate sodium (COLACE) 250 MG capsule Take 1 capsule by mouth Daily.   • glucose blood test strip Check glucose twice daily.   • omeprazole (priLOSEC) 40 MG capsule Take 1 capsule by mouth Daily.   • predniSONE (DELTASONE) 10 MG (21)  "dose pack Use as directed on package     Current Facility-Administered Medications on File Prior to Visit   Medication   • triamcinolone acetonide (KENALOG-40) injection 40 mg       Objective   Vitals:    12/12/22 1314   BP: 132/78   Pulse: 85   Resp: 16   Temp: 98.2 °F (36.8 °C)   TempSrc: Tympanic   SpO2: 99%   Weight: (!) 137 kg (302 lb)   Height: 165.1 cm (65\")     Body mass index is 50.26 kg/m².    Physical Exam  Vitals reviewed.   Constitutional:       Appearance: Normal appearance. He is well-developed.   HENT:      Head: Normocephalic and atraumatic.      Nose: Nose normal.   Eyes:      General: Lids are normal.      Conjunctiva/sclera: Conjunctivae normal.      Pupils: Pupils are equal, round, and reactive to light.   Neck:      Thyroid: No thyromegaly.      Trachea: Trachea normal.   Pulmonary:      Effort: Pulmonary effort is normal. No respiratory distress.   Skin:     General: Skin is warm and dry.      Comments: LLE below the knee medial - hard nodule, bruising, redness, swelling.    Neurological:      Mental Status: He is alert and oriented to person, place, and time.      GCS: GCS eye subscore is 4. GCS verbal subscore is 5. GCS motor subscore is 6.   Psychiatric:         Attention and Perception: Attention normal.         Mood and Affect: Mood normal.         Speech: Speech normal.         Behavior: Behavior normal. Behavior is cooperative.         Assessment & Plan   Problem List Items Addressed This Visit        Musculoskeletal and Injuries    Low back pain    Relevant Medications    HYDROcodone-acetaminophen (NORCO) 7.5-325 MG per tablet   Other Visit Diagnoses     Left leg swelling    -  Primary    Relevant Orders    Duplex Venous Lower Extremity - Left CAR    Discoloration of skin of lower leg        Relevant Orders    Duplex Venous Lower Extremity - Left CAR             Current Outpatient Medications:   •  albuterol sulfate  (90 Base) MCG/ACT inhaler, Inhale 2 puffs Every 4 (Four) Hours " As Needed for Wheezing or Shortness of Air. AS NEEDED, Disp: 18 g, Rfl: 0  •  azelastine (ASTELIN) 0.1 % nasal spray, 2 sprays into the nostril(s) as directed by provider 2 (Two) Times a Day. Use in each nostril as directed, Disp: 30 mL, Rfl: 5  •  cetirizine (zyrTEC) 10 MG tablet, Take 1 tablet by mouth Daily., Disp: 30 tablet, Rfl: 5  •  ezetimibe (Zetia) 10 MG tablet, Take 1 tablet by mouth Daily., Disp: 90 tablet, Rfl: 1  •  HYDROcodone-acetaminophen (NORCO) 7.5-325 MG per tablet, Take 1 tablet by mouth Every 6 (Six) Hours As Needed for Moderate Pain., Disp: 28 tablet, Rfl: 0  •  losartan (COZAAR) 100 MG tablet, Take 1 tablet by mouth Daily., Disp: 30 tablet, Rfl: 5  •  methocarbamol (ROBAXIN) 750 MG tablet, Take 1/2-1 tablet by mouth 4 times daily as needed for muscle spasms., Disp: 60 tablet, Rfl: 2  •  tamsulosin (FLOMAX) 0.4 MG capsule 24 hr capsule, Take 1 capsule by mouth Daily., Disp: 90 capsule, Rfl: 3  •  tiotropium bromide monohydrate (Spiriva Respimat) 2.5 MCG/ACT aerosol solution inhaler, Inhale 2 puffs Daily. DAILY, Disp: 4 g, Rfl: 5  •  Blood Gluc Meter Disp-Strips device, 1 each 2 (Two) Times a Day., Disp: 1 each, Rfl: 0  •  docusate sodium (COLACE) 250 MG capsule, Take 1 capsule by mouth Daily., Disp: 10 capsule, Rfl: 0  •  glucose blood test strip, Check glucose twice daily., Disp: 100 each, Rfl: 12  •  omeprazole (priLOSEC) 40 MG capsule, Take 1 capsule by mouth Daily., Disp: 30 capsule, Rfl: 5  •  predniSONE (DELTASONE) 10 MG (21) dose pack, Use as directed on package, Disp: 21 tablet, Rfl: 0    Current Facility-Administered Medications:   •  triamcinolone acetonide (KENALOG-40) injection 40 mg, 40 mg, Intramuscular, Once, Nish Walker, TAMAR      Plan:    Left leg swelling, new unstable. Ordered stat Doppler ultrasound to rule out blood clot. Spoke with referral coordinator who is scheduling this now.    Left-sided low back pain, chronic, unstable. Continue hydrocodone. The patient has  an upcoming appointment with Neurosurgery to discuss back injection, continue hydrocodone for now.         Plan of care reviewed with the patient at the conclusion of today's visit.  Education was provided regarding diagnosis, management, and any prescribed or recommended OTC medications.  Patient verbalized understanding of and agreement with management plan.     Return if symptoms worsen or fail to improve.      Transcribed from ambient dictation for TAMAR Reyes by TAMAR Reyes.  12/12/22   13:26 EST    Patient or patient representative verbalized consent to the visit recording.  I have personally performed the services described in this document as transcribed by the above individual, and it is both accurate and complete.

## 2023-01-21 DIAGNOSIS — E78.2 MIXED HYPERLIPIDEMIA: ICD-10-CM

## 2023-01-23 RX ORDER — EZETIMIBE 10 MG/1
TABLET ORAL
Qty: 90 TABLET | Refills: 1 | Status: SHIPPED | OUTPATIENT
Start: 2023-01-23

## 2023-02-19 DIAGNOSIS — I10 ESSENTIAL HYPERTENSION: ICD-10-CM

## 2023-02-20 RX ORDER — LOSARTAN POTASSIUM 100 MG/1
TABLET ORAL
Qty: 30 TABLET | Refills: 5 | Status: SHIPPED | OUTPATIENT
Start: 2023-02-20

## 2023-03-08 DIAGNOSIS — R39.16 BENIGN PROSTATIC HYPERPLASIA (BPH) WITH STRAINING ON URINATION: ICD-10-CM

## 2023-03-08 DIAGNOSIS — N40.1 BENIGN PROSTATIC HYPERPLASIA (BPH) WITH STRAINING ON URINATION: ICD-10-CM

## 2023-03-08 RX ORDER — TAMSULOSIN HYDROCHLORIDE 0.4 MG/1
CAPSULE ORAL
Qty: 32 CAPSULE | Refills: 2 | Status: SHIPPED | OUTPATIENT
Start: 2023-03-08

## 2023-03-30 DIAGNOSIS — G89.29 CHRONIC LEFT-SIDED LOW BACK PAIN WITH LEFT-SIDED SCIATICA: ICD-10-CM

## 2023-03-30 DIAGNOSIS — M54.42 CHRONIC LEFT-SIDED LOW BACK PAIN WITH LEFT-SIDED SCIATICA: ICD-10-CM

## 2023-03-30 RX ORDER — HYDROCODONE BITARTRATE AND ACETAMINOPHEN 7.5; 325 MG/1; MG/1
1 TABLET ORAL EVERY 6 HOURS PRN
Qty: 28 TABLET | Refills: 0 | Status: SHIPPED | OUTPATIENT
Start: 2023-03-30

## 2023-03-30 RX ORDER — AZELASTINE 1 MG/ML
2 SPRAY, METERED NASAL 2 TIMES DAILY
Qty: 30 ML | Refills: 5 | Status: SHIPPED | OUTPATIENT
Start: 2023-03-30

## 2023-05-11 DIAGNOSIS — J43.9 PULMONARY EMPHYSEMA, UNSPECIFIED EMPHYSEMA TYPE: ICD-10-CM

## 2023-05-11 RX ORDER — ALBUTEROL SULFATE 90 UG/1
2 AEROSOL, METERED RESPIRATORY (INHALATION) EVERY 4 HOURS PRN
Qty: 18 G | Refills: 0 | Status: SHIPPED | OUTPATIENT
Start: 2023-05-11

## 2023-05-19 ENCOUNTER — TELEPHONE (OUTPATIENT)
Dept: INTERNAL MEDICINE | Facility: CLINIC | Age: 58
End: 2023-05-19

## 2023-05-19 NOTE — TELEPHONE ENCOUNTER
Caller: Bryan Tamez    Relationship: Self    Best call back number:  [phone]    What form or medical record are you requesting: CLEARANCE FOR SURGERY    Who is requesting this form or medical record from you: SURGEON    How would you like to receive the form or medical records (pick-up, mail, fax):       Additional notes:  PATIENT IS SCHEDULED FOR KNEE SURGERY, HE DOESN'T HAVE A DATE AS OF YET; HE WAS ASKING IF HE COULD BRING IN THE PAPERWORK FOR [facility] TO FILL OUT FOR HIM ON TUES AS HIS WIFE HAS AN APPT IN THE OFFICE THAT DAY?    PLEASE CALL TO [facility]

## 2023-05-19 NOTE — TELEPHONE ENCOUNTER
Advised pt that he will need an appt himself to have surgery clearance. Patient transferred to  to make an appt.

## 2023-05-24 ENCOUNTER — TELEPHONE (OUTPATIENT)
Dept: INTERNAL MEDICINE | Facility: CLINIC | Age: 58
End: 2023-05-24
Payer: MEDICARE

## 2023-05-24 ENCOUNTER — OFFICE VISIT (OUTPATIENT)
Dept: INTERNAL MEDICINE | Facility: CLINIC | Age: 58
End: 2023-05-24
Payer: MEDICARE

## 2023-05-24 ENCOUNTER — APPOINTMENT (OUTPATIENT)
Dept: LAB | Facility: HOSPITAL | Age: 58
End: 2023-05-24
Payer: MEDICARE

## 2023-05-24 VITALS
HEIGHT: 65 IN | DIASTOLIC BLOOD PRESSURE: 76 MMHG | WEIGHT: 299.6 LBS | TEMPERATURE: 97.1 F | BODY MASS INDEX: 49.92 KG/M2 | HEART RATE: 80 BPM | SYSTOLIC BLOOD PRESSURE: 126 MMHG | OXYGEN SATURATION: 98 %

## 2023-05-24 DIAGNOSIS — Z00.00 HEALTH CARE MAINTENANCE: ICD-10-CM

## 2023-05-24 DIAGNOSIS — Z12.11 ENCOUNTER FOR SCREENING COLONOSCOPY: ICD-10-CM

## 2023-05-24 DIAGNOSIS — I10 ESSENTIAL HYPERTENSION: ICD-10-CM

## 2023-05-24 DIAGNOSIS — Z01.818 PRE-OPERATIVE CLEARANCE: Primary | ICD-10-CM

## 2023-05-24 DIAGNOSIS — E78.2 MIXED HYPERLIPIDEMIA: ICD-10-CM

## 2023-05-24 LAB
ALBUMIN SERPL-MCNC: 4.1 G/DL (ref 3.5–5.2)
ALBUMIN/GLOB SERPL: 1.7 G/DL
ALP SERPL-CCNC: 92 U/L (ref 39–117)
ALT SERPL W P-5'-P-CCNC: 29 U/L (ref 1–41)
ANION GAP SERPL CALCULATED.3IONS-SCNC: 7 MMOL/L (ref 5–15)
AST SERPL-CCNC: 24 U/L (ref 1–40)
BILIRUB SERPL-MCNC: 0.2 MG/DL (ref 0–1.2)
BUN SERPL-MCNC: 15 MG/DL (ref 6–20)
BUN/CREAT SERPL: 12.8 (ref 7–25)
CALCIUM SPEC-SCNC: 8.8 MG/DL (ref 8.6–10.5)
CHLORIDE SERPL-SCNC: 104 MMOL/L (ref 98–107)
CHOLEST SERPL-MCNC: 243 MG/DL (ref 0–200)
CO2 SERPL-SCNC: 30 MMOL/L (ref 22–29)
CREAT SERPL-MCNC: 1.17 MG/DL (ref 0.76–1.27)
DEPRECATED RDW RBC AUTO: 42 FL (ref 37–54)
EGFRCR SERPLBLD CKD-EPI 2021: 72.7 ML/MIN/1.73
ERYTHROCYTE [DISTWIDTH] IN BLOOD BY AUTOMATED COUNT: 12.9 % (ref 12.3–15.4)
GLOBULIN UR ELPH-MCNC: 2.4 GM/DL
GLUCOSE SERPL-MCNC: 94 MG/DL (ref 65–99)
HBA1C MFR BLD: 5.6 % (ref 4.8–5.6)
HCT VFR BLD AUTO: 47.6 % (ref 37.5–51)
HDLC SERPL-MCNC: 45 MG/DL (ref 40–60)
HGB BLD-MCNC: 16.4 G/DL (ref 13–17.7)
LDLC SERPL CALC-MCNC: 179 MG/DL (ref 0–100)
LDLC/HDLC SERPL: 3.92 {RATIO}
MCH RBC QN AUTO: 30.5 PG (ref 26.6–33)
MCHC RBC AUTO-ENTMCNC: 34.5 G/DL (ref 31.5–35.7)
MCV RBC AUTO: 88.5 FL (ref 79–97)
PLATELET # BLD AUTO: 281 10*3/MM3 (ref 140–450)
PMV BLD AUTO: 10.3 FL (ref 6–12)
POTASSIUM SERPL-SCNC: 4.8 MMOL/L (ref 3.5–5.2)
PROT SERPL-MCNC: 6.5 G/DL (ref 6–8.5)
RBC # BLD AUTO: 5.38 10*6/MM3 (ref 4.14–5.8)
SODIUM SERPL-SCNC: 141 MMOL/L (ref 136–145)
TRIGL SERPL-MCNC: 107 MG/DL (ref 0–150)
TSH SERPL DL<=0.05 MIU/L-ACNC: 2.1 UIU/ML (ref 0.27–4.2)
VLDLC SERPL-MCNC: 19 MG/DL (ref 5–40)
WBC NRBC COR # BLD: 11.35 10*3/MM3 (ref 3.4–10.8)

## 2023-05-24 PROCEDURE — 80053 COMPREHEN METABOLIC PANEL: CPT | Performed by: PHYSICIAN ASSISTANT

## 2023-05-24 PROCEDURE — 83036 HEMOGLOBIN GLYCOSYLATED A1C: CPT | Performed by: PHYSICIAN ASSISTANT

## 2023-05-24 PROCEDURE — 84443 ASSAY THYROID STIM HORMONE: CPT | Performed by: PHYSICIAN ASSISTANT

## 2023-05-24 PROCEDURE — 80061 LIPID PANEL: CPT | Performed by: PHYSICIAN ASSISTANT

## 2023-05-24 PROCEDURE — 36415 COLL VENOUS BLD VENIPUNCTURE: CPT | Performed by: PHYSICIAN ASSISTANT

## 2023-05-24 PROCEDURE — 85027 COMPLETE CBC AUTOMATED: CPT | Performed by: PHYSICIAN ASSISTANT

## 2023-05-24 NOTE — PROGRESS NOTES
[facility]  [address]  [place] [facility] [address]  Dept: [phone]  Dept Fax: [phone]  Loc: [phone]  Loc Fax: [phone]    Bryan Tamez  1965    Follow Up Office Visit Note    History of Present Illness:  Patient is a 57-year-old male in today for preop clearance for total knee replacement on the left knee.  Overall doing well and feeling well.  Denies any symptoms today.      The following portions of the patient's history were reviewed and updated as appropriate: allergies, current medications, past family history, past medical history, past social history, past surgical history, and problem list.    Medications:    Current Outpatient Medications:   •  albuterol sulfate  (90 Base) MCG/ACT inhaler, Inhale 2 puffs Every 4 (Four) Hours As Needed for Wheezing or Shortness of Air. AS NEEDED, Disp: 18 g, Rfl: 0  •  azelastine (ASTELIN) 0.1 % nasal spray, 2 sprays into the nostril(s) as directed by provider 2 (Two) Times a Day. Use in each nostril as directed, Disp: 30 mL, Rfl: 5  •  Blood Gluc Meter Disp-Strips device, 1 each 2 (Two) Times a Day., Disp: 1 each, Rfl: 0  •  cetirizine (zyrTEC) 10 MG tablet, Take 1 tablet by mouth Daily., Disp: 30 tablet, Rfl: 5  •  HYDROcodone-acetaminophen (NORCO) 7.5-325 MG per tablet, Take 1 tablet by mouth Every 6 (Six) Hours As Needed for Moderate Pain., Disp: 28 tablet, Rfl: 0  •  tamsulosin (FLOMAX) 0.4 MG capsule 24 hr capsule, TAKE ONE CAPSULE BY MOUTH DAILY, Disp: 32 capsule, Rfl: 2    Current Facility-Administered Medications:   •  triamcinolone acetonide (KENALOG-40) injection 40 mg, 40 mg, Intramuscular, Once, Nish Walker APRN    Subjective  No Known Allergies     Past Medical History:   Diagnosis Date   • Abnormal ECG    • Arthritis    • Cervical disc disorder    • Headache    • Hyperlipemia    • Hypertension    • Low back pain    • Lumbosacral disc disease    • Sleep apnea      What pharmacy?   does not wear CPAP       Past Surgical History:   Procedure Laterality Date   • APPENDECTOMY     • CHOLECYSTECTOMY N/A 11/23/2020    Procedure: CHOLECYSTECTOMY LAPAROSCOPIC;  Surgeon: Buddy Angel MD;  Location: [facility];  Service: General;  Laterality: N/A;   • COLONOSCOPY     • KNEE ARTHROSCOPY     • TIBIA FRACTURE SURGERY     • WRIST SURGERY         History reviewed. No pertinent family history.     Social History     Socioeconomic History   • Marital status:    Tobacco Use   • Smoking status: Every Day     Packs/day: 0.50     Years: 40.00     Pack years: 20.00     Types: Cigarettes     Start date: 1/1/1980   • Smokeless tobacco: Never   Vaping Use   • Vaping Use: Never used   Substance and Sexual Activity   • Alcohol use: Yes     Alcohol/week: 1.0 standard drink     Types: 1 Cans of beer per week     Comment: Very seldom   • Drug use: Never   • Sexual activity: Yes     Partners: Female     Birth control/protection: None     Comment: Only when wife feels like it.       Review of Systems   Constitutional: Negative for activity change, chills, fatigue, fever and unexpected weight change.   HENT: Negative for congestion, ear pain, postnasal drip, sinus pressure and sore throat.    Eyes: Negative for pain, discharge and redness.   Respiratory: Negative for cough, shortness of breath and wheezing.    Cardiovascular: Negative for chest pain, palpitations and leg swelling.   Gastrointestinal: Negative for diarrhea, nausea and vomiting.   Endocrine: Negative for cold intolerance and heat intolerance.   Genitourinary: Negative for decreased urine volume and dysuria.   Musculoskeletal: Negative for arthralgias and myalgias.   Skin: Negative for rash and wound.   Neurological: Negative for dizziness, light-headedness and headaches.   Hematological: Does not bruise/bleed easily.   Psychiatric/Behavioral: Negative for confusion, dysphoric mood and sleep disturbance. The patient is not nervous/anxious.   "        Objective  Vitals:    05/24/23 0826   BP: 126/76   BP Location: Left arm   Patient Position: Sitting   Cuff Size: Large Adult   Pulse: 80   Temp: 97.1 °F (36.2 °C)   TempSrc: Temporal   SpO2: 98%   Weight: 136 kg (299 lb 9.6 oz)   Height: 165.1 cm (65\")     Body mass index is 49.86 kg/m².     Physical Exam  Physical Exam  Vitals and nursing note reviewed.   Constitutional:       General: He is not in acute distress.     Appearance: He is not ill-appearing.   HENT:      Head: Normocephalic.      Right Ear: Tympanic membrane, ear canal and external ear normal. There is no impacted cerumen.      Left Ear: Tympanic membrane, ear canal and external ear normal. There is no impacted cerumen.      Nose: No congestion or rhinorrhea.      Mouth/Throat:      Mouth: Mucous membranes are moist.      Pharynx: Oropharynx is clear. No oropharyngeal exudate or posterior oropharyngeal erythema.   Eyes:      General:         Right eye: No discharge.         Left eye: No discharge.      Extraocular Movements: Extraocular movements intact.      Conjunctiva/sclera: Conjunctivae normal.      Pupils: Pupils are equal, round, and reactive to light.   Cardiovascular:      Rate and Rhythm: Normal rate and regular rhythm.      Heart sounds: Normal heart sounds. No murmur heard.    No friction rub. No gallop.   Pulmonary:      Effort: Pulmonary effort is normal. No respiratory distress.      Breath sounds: Normal breath sounds. No wheezing.   Abdominal:      General: Bowel sounds are normal. There is no distension.      Palpations: Abdomen is soft. There is no mass.      Tenderness: There is no abdominal tenderness.   Musculoskeletal:         General: No swelling. Normal range of motion.      Cervical back: Normal range of motion. No tenderness.      Right lower leg: No edema.      Left lower leg: No edema.   Lymphadenopathy:      Cervical: No cervical adenopathy.   Skin:     Findings: No bruising, erythema or rash.   Neurological:      " Mental Status: He is oriented to person, place, and time.      Gait: Gait normal.   Psychiatric:         Mood and Affect: Mood normal.         Behavior: Behavior normal.         Thought Content: Thought content normal.         Judgment: Judgment normal.         Diagnostic Data    ECG 12 Lead    Date/Time: 5/24/2023 8:50 AM  Performed by: Evan Olivera PA-C  Authorized by: Evan Olivera PA-C   Rhythm: sinus rhythm  Rate: normal  QRS axis: normal    Clinical impression: normal ECG            Assessment  Diagnoses and all orders for this visit:    1. Pre-operative clearance (Primary)  -     CBC (No Diff)  -     Comprehensive Metabolic Panel  -     XR Chest PA & Lateral; Future    2. Health care maintenance  -     TSH; Future  -     Hemoglobin A1c; Future  -     Lipid Panel    3. Encounter for screening colonoscopy  -     Ambulatory Referral to Gastroenterology    4. Essential hypertension    5. Mixed hyperlipidemia        Plan    1. Pre-operative clearance (Primary)- obtain CBC, CMP, and chest x-ray.  EKG in office revealed.NSR w/o any ST changes.    2. Health care maintenance- obtain fasting labs.    3. Encounter for screening colonoscopy- referred to GI.    4. Essential hypertension- well-controlled today.  Not taking any medicine.  Was prescribed losartan 100 mg daily.    5. Mixed hyperlipidemia- was given Zetia, currently not taking.      Return for Next scheduled follow up, [facility].    Evan Olivera PA-C  05/24/2023

## 2023-05-24 NOTE — TELEPHONE ENCOUNTER
Spoke with patient about lab results. Patient verbalized understanding. Patient no further questions at this time.     ----- Message from Evan Olivera PA-C sent at 5/24/2023  3:23 PM EDT -----  Cholesterol better but still slightly high.  Needs to follow-up with PCP.  Otherwise normal labs from a preop standpoint.  Awaiting chest x-ray results.

## 2023-05-26 ENCOUNTER — HOSPITAL ENCOUNTER (OUTPATIENT)
Dept: GENERAL RADIOLOGY | Facility: HOSPITAL | Age: 58
Discharge: HOME | End: 2023-05-26
Payer: MEDICARE

## 2023-05-26 DIAGNOSIS — Z01.818 PRE-OPERATIVE CLEARANCE: ICD-10-CM

## 2023-05-26 PROCEDURE — 71046 X-RAY EXAM CHEST 2 VIEWS: CPT

## 2023-05-30 ENCOUNTER — TELEPHONE (OUTPATIENT)
Dept: INTERNAL MEDICINE | Facility: CLINIC | Age: 58
End: 2023-05-30

## 2023-05-30 NOTE — TELEPHONE ENCOUNTER
Patient viewed results on [employer]      ----- Message from Evan Olivera PA-C sent at 5/26/2023  6:34 PM EDT -----  Normal chest xray.

## 2023-06-03 DIAGNOSIS — J43.9 PULMONARY EMPHYSEMA, UNSPECIFIED EMPHYSEMA TYPE: ICD-10-CM

## 2023-06-03 DIAGNOSIS — M54.42 CHRONIC LEFT-SIDED LOW BACK PAIN WITH LEFT-SIDED SCIATICA: ICD-10-CM

## 2023-06-03 DIAGNOSIS — G89.29 CHRONIC LEFT-SIDED LOW BACK PAIN WITH LEFT-SIDED SCIATICA: ICD-10-CM

## 2023-06-05 RX ORDER — ALBUTEROL SULFATE 90 UG/1
2 AEROSOL, METERED RESPIRATORY (INHALATION) EVERY 4 HOURS PRN
Qty: 18 G | Refills: 0 | Status: SHIPPED | OUTPATIENT
Start: 2023-06-05

## 2023-06-05 RX ORDER — HYDROCODONE BITARTRATE AND ACETAMINOPHEN 7.5; 325 MG/1; MG/1
1 TABLET ORAL EVERY 6 HOURS PRN
Qty: 28 TABLET | Refills: 0 | Status: SHIPPED | OUTPATIENT
Start: 2023-06-05

## 2023-07-10 ENCOUNTER — PREP FOR SURGERY (OUTPATIENT)
Dept: GASTROENTEROLOGY | Facility: CLINIC | Age: 58
End: 2023-07-10

## 2023-07-10 DIAGNOSIS — Z12.11 SCREEN FOR COLON CANCER: Primary | ICD-10-CM

## 2023-08-22 RX ORDER — LOSARTAN POTASSIUM 100 MG/1
TABLET ORAL
Qty: 90 TABLET | OUTPATIENT
Start: 2023-08-22

## 2023-09-19 DIAGNOSIS — N40.1 BENIGN PROSTATIC HYPERPLASIA (BPH) WITH STRAINING ON URINATION: ICD-10-CM

## 2023-09-19 DIAGNOSIS — R39.16 BENIGN PROSTATIC HYPERPLASIA (BPH) WITH STRAINING ON URINATION: ICD-10-CM

## 2023-09-19 RX ORDER — TAMSULOSIN HYDROCHLORIDE 0.4 MG/1
1 CAPSULE ORAL DAILY
Qty: 32 CAPSULE | Refills: 2 | Status: SHIPPED | OUTPATIENT
Start: 2023-09-19

## 2023-12-14 DIAGNOSIS — N40.1 BENIGN PROSTATIC HYPERPLASIA (BPH) WITH STRAINING ON URINATION: ICD-10-CM

## 2023-12-14 DIAGNOSIS — R39.16 BENIGN PROSTATIC HYPERPLASIA (BPH) WITH STRAINING ON URINATION: ICD-10-CM

## 2023-12-14 RX ORDER — TAMSULOSIN HYDROCHLORIDE 0.4 MG/1
1 CAPSULE ORAL DAILY
Qty: 90 CAPSULE | Refills: 0 | Status: SHIPPED | OUTPATIENT
Start: 2023-12-14

## 2024-03-16 DIAGNOSIS — N40.1 BENIGN PROSTATIC HYPERPLASIA (BPH) WITH STRAINING ON URINATION: ICD-10-CM

## 2024-03-16 DIAGNOSIS — R39.16 BENIGN PROSTATIC HYPERPLASIA (BPH) WITH STRAINING ON URINATION: ICD-10-CM

## 2024-03-18 RX ORDER — TAMSULOSIN HYDROCHLORIDE 0.4 MG/1
1 CAPSULE ORAL DAILY
Qty: 90 CAPSULE | Refills: 0 | Status: SHIPPED | OUTPATIENT
Start: 2024-03-18

## 2024-03-22 ENCOUNTER — READMISSION MANAGEMENT (OUTPATIENT)
Dept: CALL CENTER | Facility: HOSPITAL | Age: 59
End: 2024-03-22
Payer: MEDICARE

## 2024-03-23 NOTE — OUTREACH NOTE
Prep Survey      Flowsheet Row Responses   [facility]? [facility]   Is LACE score < 7 ? 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 Other   Does the patient have Home health ordered? No   Is there a DME ordered? No   Prep survey completed? Yes            BERNIE MCCRAY - Registered Nurse

## 2024-03-25 ENCOUNTER — TRANSITIONAL CARE MANAGEMENT TELEPHONE ENCOUNTER (OUTPATIENT)
Dept: CALL CENTER | Facility: HOSPITAL | Age: 59
End: 2024-03-25
Payer: MEDICARE

## 2024-03-25 NOTE — OUTREACH NOTE
Call Center TCM Note      Flowsheet Row Responses   [facility] patient discharged from? Non-   Does the patient have one of the following disease processes/diagnoses(primary or secondary)? Other   TCM attempt successful? No   Unsuccessful attempts Attempt 1   Revoked Reason Other            Jae Albarran RN    3/25/2024, 14:40 EDT

## 2024-06-14 DIAGNOSIS — N40.1 BENIGN PROSTATIC HYPERPLASIA (BPH) WITH STRAINING ON URINATION: ICD-10-CM

## 2024-06-14 DIAGNOSIS — R39.16 BENIGN PROSTATIC HYPERPLASIA (BPH) WITH STRAINING ON URINATION: ICD-10-CM

## 2024-06-14 RX ORDER — TAMSULOSIN HYDROCHLORIDE 0.4 MG/1
1 CAPSULE ORAL DAILY
Qty: 90 CAPSULE | Refills: 0 | OUTPATIENT
Start: 2024-06-14

## 2025-04-30 ENCOUNTER — OFFICE VISIT (OUTPATIENT)
Dept: UROLOGY | Facility: CLINIC | Age: 60
End: 2025-04-30
Payer: COMMERCIAL

## 2025-04-30 VITALS
HEART RATE: 86 BPM | OXYGEN SATURATION: 97 % | WEIGHT: 237.2 LBS | DIASTOLIC BLOOD PRESSURE: 90 MMHG | RESPIRATION RATE: 16 BRPM | BODY MASS INDEX: 39.52 KG/M2 | HEIGHT: 65 IN | SYSTOLIC BLOOD PRESSURE: 160 MMHG | TEMPERATURE: 97.5 F

## 2025-04-30 DIAGNOSIS — R31.0 GROSS HEMATURIA: Primary | ICD-10-CM

## 2025-04-30 DIAGNOSIS — F17.200 TOBACCO USE DISORDER: ICD-10-CM

## 2025-04-30 DIAGNOSIS — R35.1 NOCTURIA: ICD-10-CM

## 2025-04-30 PROBLEM — M79.A22 NONTRAUMATIC COMPARTMENT SYNDROME OF LEFT LOWER EXTREMITY: Status: ACTIVE | Noted: 2024-03-19

## 2025-04-30 PROBLEM — Z98.890 H/O FASCIOTOMY: Status: ACTIVE | Noted: 2024-07-08

## 2025-04-30 PROBLEM — Z86.14 HISTORY OF METHICILLIN RESISTANT STAPHYLOCOCCUS AUREUS INFECTION: Status: ACTIVE | Noted: 2024-07-08

## 2025-04-30 PROBLEM — Z87.39 HISTORY OF INFECTION OF TOTAL JOINT PROSTHESIS OF KNEE: Status: ACTIVE | Noted: 2024-04-19

## 2025-04-30 PROBLEM — Z77.22 SECOND HAND SMOKE EXPOSURE: Status: ACTIVE | Noted: 2023-11-16

## 2025-04-30 PROBLEM — E66.9 OBESITY (BMI 35.0-39.9 WITHOUT COMORBIDITY): Status: ACTIVE | Noted: 2024-06-26

## 2025-04-30 PROBLEM — M17.9 OSTEOARTHRITIS OF KNEE: Status: ACTIVE | Noted: 2024-09-05

## 2025-04-30 PROBLEM — Z96.659 HISTORY OF TOTAL KNEE REPLACEMENT: Status: ACTIVE | Noted: 2024-05-14

## 2025-04-30 PROBLEM — G89.29 CHRONIC PAIN: Status: ACTIVE | Noted: 2024-09-05

## 2025-04-30 PROBLEM — G57.70 COMPLEX REGIONAL PAIN SYNDROME TYPE 2 OF LOWER EXTREMITY: Status: ACTIVE | Noted: 2024-09-05

## 2025-04-30 PROBLEM — K64.8 INTERNAL HEMORRHOIDS WITHOUT COMPLICATION: Status: ACTIVE | Noted: 2023-11-16

## 2025-04-30 PROBLEM — N40.0 BENIGN PROSTATIC HYPERPLASIA WITHOUT LOWER URINARY TRACT SYMPTOMS: Status: ACTIVE | Noted: 2024-05-14

## 2025-04-30 LAB
BILIRUB BLD-MCNC: NEGATIVE MG/DL
CLARITY, POC: CLEAR
COLOR UR: YELLOW
EXPIRATION DATE: ABNORMAL
GLUCOSE UR STRIP-MCNC: NEGATIVE MG/DL
KETONES UR QL: NEGATIVE
LEUKOCYTE EST, POC: ABNORMAL
Lab: ABNORMAL
NITRITE UR-MCNC: NEGATIVE MG/ML
PH UR: 7 [PH] (ref 5–8)
PROT UR STRIP-MCNC: ABNORMAL MG/DL
RBC # UR STRIP: ABNORMAL /UL
SP GR UR: 1.02 (ref 1–1.03)
UROBILINOGEN UR QL: ABNORMAL

## 2025-04-30 RX ORDER — AMOXICILLIN 250 MG
CAPSULE ORAL
COMMUNITY

## 2025-04-30 RX ORDER — METHOCARBAMOL 750 MG/1
TABLET, FILM COATED ORAL
COMMUNITY

## 2025-04-30 RX ORDER — CITALOPRAM HYDROBROMIDE 20 MG/1
TABLET ORAL
COMMUNITY
Start: 2025-04-22

## 2025-04-30 RX ORDER — POTASSIUM CHLORIDE 750 MG/1
TABLET, EXTENDED RELEASE ORAL
COMMUNITY

## 2025-04-30 RX ORDER — DIPHENHYDRAMINE HCL 50 MG
50 CAPSULE ORAL
COMMUNITY

## 2025-04-30 RX ORDER — LISINOPRIL 5 MG/1
5 TABLET ORAL DAILY
COMMUNITY

## 2025-04-30 RX ORDER — GABAPENTIN 300 MG/1
CAPSULE ORAL
COMMUNITY

## 2025-04-30 RX ORDER — OXYCODONE HYDROCHLORIDE 10 MG/1
TABLET ORAL
COMMUNITY

## 2025-04-30 RX ORDER — FUROSEMIDE 40 MG/1
TABLET ORAL
COMMUNITY

## 2025-04-30 RX ORDER — ACETAMINOPHEN 325 MG/1
650 TABLET ORAL
COMMUNITY

## 2025-04-30 RX ORDER — RIFAXIMIN 550 MG/1
1 TABLET ORAL 3 TIMES DAILY
COMMUNITY
Start: 2025-02-28

## 2025-04-30 RX ORDER — OMEPRAZOLE 40 MG/1
CAPSULE, DELAYED RELEASE ORAL
COMMUNITY
Start: 2025-02-28

## 2025-04-30 RX ORDER — CLONIDINE HYDROCHLORIDE 0.1 MG/1
TABLET ORAL
COMMUNITY

## 2025-04-30 RX ORDER — SUCRALFATE 1 G/1
TABLET ORAL
COMMUNITY

## 2025-04-30 RX ORDER — PREGABALIN 75 MG/1
CAPSULE ORAL
COMMUNITY

## 2025-04-30 RX ORDER — PREDNISONE 20 MG/1
TABLET ORAL
COMMUNITY

## 2025-04-30 RX ORDER — DICYCLOMINE HYDROCHLORIDE 10 MG/1
CAPSULE ORAL
COMMUNITY
Start: 2025-02-28

## 2025-04-30 RX ORDER — HYDROXYZINE HYDROCHLORIDE 25 MG/1
TABLET, FILM COATED ORAL
COMMUNITY

## 2025-04-30 RX ORDER — ASPIRIN 81 MG/1
81 TABLET ORAL DAILY
COMMUNITY

## 2025-04-30 NOTE — PROGRESS NOTES
Office Visit     Patient Name: Bryan Tamez  : 1965   MRN: [id number]   Patient or patient representative verbalized consent for the use of Ambient Listening during the visit with  TAMAR Carl for chart documentation. 2025  13:39 EDT    Chief Complaint   Patient presents with    gross hematuria     Referring Provider: Arpita Mittal APRN    Primary Care Provider: Arpita Mittal APRN     History of Present Illness  Mr. Tamez is a 59 year old male who was referred to urology for concerns of gross hematuria.      Hematuria  - First noted 2 months ago with occasional clots.      Nocturia  - Occurs every 2 hours.  - Primarily drinks tea and water, minimal soda.  - Stops fluids 1 hour before bedtime.    Past Medical History  - No history of nephrolithiasis.  - Last year, underwent total knee replacement complicated by sepsis.    Supplemental information: None provided.    SOCIAL HISTORY  Smokes everyday 0.5 ppd and drinks beer occasionally.     Subjective   Review of System: As noted in HPI.    Past Medical History:   Diagnosis Date    Abnormal ECG     Arthritis     Cervical disc disorder     Headache     Hyperlipemia     Hypertension     Low back pain     Lumbosacral disc disease     Sleep apnea     does not wear CPAP     Past Surgical History:   Procedure Laterality Date    APPENDECTOMY      CHOLECYSTECTOMY N/A 2020    Procedure: CHOLECYSTECTOMY LAPAROSCOPIC;  Surgeon: Buddy Angel MD;  [facility];  Service: General;  Laterality: N/A;    COLONOSCOPY      KNEE ARTHROSCOPY      TIBIA FRACTURE SURGERY      WRIST SURGERY       History reviewed. No pertinent family history.  Social History     Socioeconomic History    Marital status:    Tobacco Use    Smoking status: Every Day     Current packs/day: 0.50     Average packs/day: 0.5 packs/day for 45.3 years (22.7 ttl pk-yrs)     Types: Cigarettes     Start date: 1980    Smokeless tobacco: Never   Vaping Use     Vaping status: Never Used   Substance and Sexual Activity    Alcohol use: Yes     Alcohol/week: 1.0 standard drink of alcohol     Types: 1 Cans of beer per week     Comment: Very seldom    Drug use: Never    Sexual activity: Yes     Partners: Female     Birth control/protection: None     Comment: Only when wife feels like it.       Current Outpatient Medications:     albuterol sulfate  (90 Base) MCG/ACT inhaler, Inhale 2 puffs Every 4 (Four) Hours As Needed for Wheezing or Shortness of Air. AS NEEDED, Disp: 18 g, Rfl: 0    lisinopril (PRINIVIL,ZESTRIL) 5 MG tablet, Take 1 tablet by mouth Daily., Disp: , Rfl:     omeprazole (priLOSEC) 40 MG capsule, TAKE 1 CAPSULE BY MOUTH ONCE DAILY. DO NOT CRUSH OR CHEW, Disp: , Rfl:     acetaminophen (TYLENOL) 325 MG tablet, Take 2 tablets by mouth. (Patient not taking: Reported on 4/30/2025), Disp: , Rfl:     aspirin 81 MG EC tablet, Take 1 tablet by mouth Daily. (Patient not taking: Reported on 4/30/2025), Disp: , Rfl:     azelastine (ASTELIN) 0.1 % nasal spray, 2 sprays into the nostril(s) as directed by provider 2 (Two) Times a Day. Use in each nostril as directed (Patient not taking: Reported on 4/30/2025), Disp: 30 mL, Rfl: 5    Blood Gluc Meter Disp-Strips device, 1 each 2 (Two) Times a Day. (Patient not taking: Reported on 4/30/2025), Disp: 1 each, Rfl: 0    cetirizine (zyrTEC) 10 MG tablet, Take 1 tablet by mouth Daily. (Patient not taking: Reported on 4/30/2025), Disp: 30 tablet, Rfl: 5    citalopram (CeleXA) 20 MG tablet, , Disp: , Rfl:     cloNIDine (CATAPRES) 0.1 MG tablet, , Disp: , Rfl:     Diclofenac Sodium (VOLTAREN) 1 % gel gel, APPLY 2 GRAMS TOPICALLY 4 TIMES DAILY TO SINGLE ELBOW, WRIST OR HAND FOR HAND INCLUDES PALM/FINGERS/BACK OF HAND (Patient not taking: Reported on 4/30/2025), Disp: , Rfl:     dicyclomine (BENTYL) 10 MG capsule, , Disp: , Rfl:     diphenhydrAMINE (BENADRYL) 50 MG capsule, Take 1 capsule by mouth. (Patient not taking: Reported on  4/30/2025), Disp: , Rfl:     furosemide (LASIX) 40 MG tablet, , Disp: , Rfl:     gabapentin (NEURONTIN) 300 MG capsule, , Disp: , Rfl:     glucose monitor monitoring kit, 1 each As Needed (use to check blood sugar). (Patient not taking: Reported on 4/30/2025), Disp: 1 each, Rfl: 0    HYDROcodone-acetaminophen (NORCO) 7.5-325 MG per tablet, Take 1 tablet by mouth Every 6 (Six) Hours As Needed for Moderate Pain (NEEDS APPT WITH [facility] BEFORE FURTHER REFILLS). (Patient not taking: Reported on 4/30/2025), Disp: 28 tablet, Rfl: 0    hydrOXYzine (ATARAX) 25 MG tablet, , Disp: , Rfl:     Lancets misc, 1 each 2 (Two) Times a Day As Needed (as need twice daily). (Patient not taking: Reported on 4/30/2025), Disp: 100 each, Rfl: 3    methocarbamol (ROBAXIN) 750 MG tablet, , Disp: , Rfl:     oxyCODONE (ROXICODONE) 10 MG tablet, , Disp: , Rfl:     potassium chloride 10 MEQ CR tablet, , Disp: , Rfl:     predniSONE (DELTASONE) 20 MG tablet, , Disp: , Rfl:     pregabalin (LYRICA) 75 MG capsule, , Disp: , Rfl:     sennosides-docusate (PERICOLACE) 8.6-50 MG per tablet, TAKE 1 TABLET BY MOUTH ONCE DAILY AS NEEDED FOR CONSTIPATION (Patient not taking: Reported on 4/30/2025), Disp: , Rfl:     sucralfate (CARAFATE) 1 g tablet, , Disp: , Rfl:     tamsulosin (FLOMAX) 0.4 MG capsule 24 hr capsule, TAKE 1 CAPSULE BY MOUTH DAILY (Patient not taking: Reported on 4/30/2025), Disp: 90 capsule, Rfl: 0    Umeclidinium-Vilanterol (ANORO ELLIPTA) 62.5-25 MCG/ACT aerosol powder  inhaler, Inhale 1 puff Daily. (Patient not taking: Reported on 4/30/2025), Disp: 1 each, Rfl: 1    Xifaxan 550 MG tablet, Take 1 tablet by mouth 3 times a day. (Patient not taking: Reported on 4/30/2025), Disp: , Rfl:     Current Facility-Administered Medications:     triamcinolone acetonide (KENALOG-40) injection 40 mg, 40 mg, Intramuscular, Once, Nish Walker APRN    Allergies   Allergen Reactions    Daptomycin Unknown - Low Severity     Eosinophilic pneumonia     "Ceftaroline Rash    Doxycycline Rash     ? Rash - d/t chart hx    Rifampin Diarrhea     Objective   Visit Vitals  /90 (BP Location: Left arm, Patient Position: Sitting, Cuff Size: Adult)   Pulse 86   Temp 97.5 °F (36.4 °C) (Temporal)   Resp 16   Ht 166.2 cm (65.43\")   Wt 108 kg (237 lb 3.2 oz)   SpO2 97%   BMI 38.95 kg/m²      Body mass index is 38.95 kg/m².   Physical Exam  Vitals and nursing note reviewed.   Constitutional:       General: He is not in acute distress.     Appearance: Normal appearance. He is obese. He is not ill-appearing.   HENT:      Head: Normocephalic and atraumatic.   Pulmonary:      Effort: Pulmonary effort is normal.   Skin:     General: Skin is warm and dry.   Neurological:      General: No focal deficit present.      Mental Status: He is alert and oriented to person, place, and time.   Psychiatric:         Mood and Affect: Mood normal.         Behavior: Behavior normal.     Labs  Lab Results   Component Value Date    COLORU Yellow 04/30/2025    CLARITYU Clear 04/30/2025    SPECGRAV 1.020 04/30/2025    PHUR 7.0 04/30/2025    LEUKOCYTESUR Small (1+) (A) 04/30/2025    NITRITE Negative 04/30/2025    PROTEINPOCUA 30 mg/dL (A) 04/30/2025    GLUCOSEUR Negative 04/30/2025    KETONESU Negative 04/30/2025    UROBILINOGEN 0.2 E.U./dL 04/30/2025    BILIRUBINUR Negative 04/30/2025    RBCUR Large (A) 04/30/2025     Lab Results   Component Value Date    WBC 10.65 (H) 10/07/2024    HGB 14.5 10/07/2024    HCT 43.8 10/07/2024    MCV 88 10/07/2024     10/07/2024     Lab Results   Component Value Date    GLUCOSE 94 05/24/2023    CALCIUM 8.8 05/24/2023     05/24/2023    K 4.8 05/24/2023    CO2 30.0 (H) 05/24/2023     05/24/2023    BUN 15 05/24/2023    BUN 16 04/29/2022    CREATININE 1.17 05/24/2023    CREATININE 1.10 09/19/2022    EGFR 72.7 05/24/2023    EGFR 73.2 04/29/2022    BCR 12.8 05/24/2023    ANIONGAP 7.0 05/24/2023    ALT 29 05/24/2023    AST 24 05/24/2023     Lab Results "   Component Value Date    HGBA1C 5.1 05/14/2024     Lab Results   Component Value Date    PSA 0.614 05/10/2021    PSA 0.74 11/25/2019     Lab Results   Component Value Date    FONE69QQ 25.1 10/07/2024     Radiographic Studies  CT Head With & Without Contrast  Result Date: 2/5/2025  Unremarkable CT of the brain . Images reviewed, interpreted, and dictated by Dr. Evan Young. Transcribed by Herson Yang PA-C      I have reviewed the above labs and imaging.     Assessment / Plan    Diagnoses and all orders for this visit:    1. Gross hematuria (Primary)  -     POC Urinalysis Dipstick, Automated  -     CT Abdomen Pelvis With & Without Contrast; Future    2. Nocturia    3. Tobacco use disorder       Assessment & Plan  1. Gross Hematuria  - Tobacco use, age and male gender increases urothelial cancer risk  - UA negative for nitrites and only small amount of leukocytes.  Infection unlikely.   - PSA normal at 0.69, reducing prostate cancer likelihood  - Plan:    - Order CT urogram to assess kidneys, ureters, bladder, urethra for masses or nephrolithiasis    - Schedule cystoscopy with Dr. Fuller    2. Nocturia     - Advise limiting fluids before bedtime    - Avoid bladder irritants (coffee, tea, soda, citrus juices, beer)    - Increase intake of non-irritating fluids.    - IPSS at next visit.      3. Tobacco use  - encouraged tobacco cessation.  Patient is not interested in quitting at this time.        Return for f/u with Dr. Fuller for cystoscopy, CT urogram to be completed prior.    Anne Finley, MSN, APRN, FNP-C  [facility]

## 2025-05-20 ENCOUNTER — TELEPHONE (OUTPATIENT)
Dept: UROLOGY | Facility: CLINIC | Age: 60
End: 2025-05-20

## 2025-05-20 NOTE — TELEPHONE ENCOUNTER
Hub staff attempted to follow warm transfer process and was unsuccessful     Caller: Bryan Tamez     Relationship to patient: SELF    Best call back number:[phone]     Patient is needing: PT IS CALLING [facility] HE HAS A LOT MORE BLOOD IN URINE AND HOPING TO DO HIS CYSTO SOON PLEASE GIVE HIM A CALL BACK

## 2025-05-23 NOTE — TELEPHONE ENCOUNTER
Dr. Fuller has no sooner openings unfortunately patient will have to wait and he is on wait list if we have any cancels    HakanCMA

## 2025-06-03 ENCOUNTER — HOSPITAL ENCOUNTER (OUTPATIENT)
Dept: CT IMAGING | Facility: HOSPITAL | Age: 60
Discharge: HOME | End: 2025-06-03
Admitting: NURSE PRACTITIONER
Payer: COMMERCIAL

## 2025-06-03 DIAGNOSIS — R31.0 GROSS HEMATURIA: ICD-10-CM

## 2025-06-03 PROCEDURE — 25510000001 IOPAMIDOL 61 % SOLUTION: Performed by: NURSE PRACTITIONER

## 2025-06-03 PROCEDURE — 74178 CT ABD&PLV WO CNTR FLWD CNTR: CPT

## 2025-06-03 RX ORDER — IOPAMIDOL 612 MG/ML
100 INJECTION, SOLUTION INTRAVASCULAR
Status: COMPLETED | OUTPATIENT
Start: 2025-06-03 | End: 2025-06-03

## 2025-06-03 RX ADMIN — IOPAMIDOL 100 ML: 612 INJECTION, SOLUTION INTRAVENOUS at 10:24

## 2025-06-16 ENCOUNTER — PROCEDURE VISIT (OUTPATIENT)
Dept: UROLOGY | Facility: CLINIC | Age: 60
End: 2025-06-16
Payer: COMMERCIAL

## 2025-06-16 DIAGNOSIS — R31.0 GROSS HEMATURIA: Primary | ICD-10-CM

## 2025-06-16 DIAGNOSIS — D49.4 BLADDER TUMOR: ICD-10-CM

## 2025-06-16 PROCEDURE — 52000 CYSTOURETHROSCOPY: CPT | Performed by: UROLOGY

## 2025-06-16 NOTE — PROGRESS NOTES
Preprocedure diagnosis  Gross hematuria x 4 months    Postprocedure diagnosis  Large bladder tumor    Procedure  Flexible Cystourethroscopy    Attending surgeon  Bhupendra Fuller MD    Anesthesia  2% lidocaine jelly intraurethrally    Complications  None    Indications  59 y.o. male undergoing a flexible cystoscopy for the above mentioned indications.  Informed consent was obtained.      Findings  Cystoscopic findings included a very large fungating bladder tumor on the left posterior wall.  Questionable whether or not it is completely resectable.    Procedure  The patient was placed in supine position and prepped and draped in sterile fashion with lidocaine jelly per urethra for anesthesia.  A timeout was performed.  The 14F flexible cystoscope was lubricated and gently placed through the penile urethra and into the bladder.  The bladder was completely visualized.  The cystoscope was retroflexed and the bladder neck and prostate visualized.  The cystoscope was slowly withdrawn while visualizing the urethra and the procedure terminated.  The patient tolerated the procedure well.      CT Abdomen Pelvis With & Without Contrast  Result Date: 6/3/2025  Enhancing mass in the urinary bladder along the superior and left lateral wall is consistent with malignancy, probably urothelial carcinoma. Cystoscopy is recommended.  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  CTDI: 12.39 mGy DLP: 2211.02 mGy.cm   This study was performed with techniques to keep radiation doses as low as reasonably achievable (ALARA). Individualized dose reduction techniques using automated exposure control or adjustment of mA and/or kV according to the patient size were employed.     Images were reviewed, interpreted, and dictated by EMILY Thurman Transcribed by Rosina Sun PA-C.  This report was signed and finalized on 6/3/2025 3:06 PM by EMILY Colon.      I  personally reviewed the patient's CT imaging and I did detect some small lymphadenopathy in the obturator space.    I had a long conversation with the patient and counseled him about his large bladder tumor.  I have voiced my concern that I am worried that it is an invasive bladder cancer, possibly T2 or T3.  I am worried about the possibly enlarged lymph nodes as well.  We will get a CT chest for appropriate staging and schedule him for TURBT as soon as possible.

## 2025-06-18 ENCOUNTER — TELEPHONE (OUTPATIENT)
Dept: UROLOGY | Facility: CLINIC | Age: 60
End: 2025-06-18
Payer: COMMERCIAL

## 2025-06-18 ENCOUNTER — HOSPITAL ENCOUNTER (OUTPATIENT)
Dept: CT IMAGING | Facility: HOSPITAL | Age: 60
Discharge: HOME | End: 2025-06-18
Admitting: UROLOGY
Payer: COMMERCIAL

## 2025-06-18 DIAGNOSIS — D49.4 BLADDER TUMOR: ICD-10-CM

## 2025-06-18 PROCEDURE — 71250 CT THORAX DX C-: CPT

## 2025-06-18 RX ORDER — IOPAMIDOL 612 MG/ML
100 INJECTION, SOLUTION INTRAVASCULAR
Status: DISCONTINUED | OUTPATIENT
Start: 2025-06-18 | End: 2025-06-19 | Stop reason: HOSPADM

## 2025-06-18 NOTE — TELEPHONE ENCOUNTER
----- Message from Bhupendra Fuller sent at 6/18/2025  3:34 PM EDT -----  Regarding: RE: CT SCAN  Know it is fine  ----- Message -----  From: Stefania Chow  Sent: 6/18/2025  11:33 AM EDT  To: Bhupendra Fuller MD  Subject: CT SCAN                                          Radiology was unable to get an IV in for contrast this morning, so they did it without.  Patient is asking if that will be okay or does he need to do something else?

## 2025-06-24 ENCOUNTER — TELEPHONE (OUTPATIENT)
Dept: UROLOGY | Facility: CLINIC | Age: 60
End: 2025-06-24

## 2025-06-24 ENCOUNTER — OUTSIDE FACILITY SERVICE (OUTPATIENT)
Dept: UROLOGY | Facility: CLINIC | Age: 60
End: 2025-06-24
Payer: COMMERCIAL

## 2025-06-24 DIAGNOSIS — D49.4 BLADDER TUMOR: Primary | ICD-10-CM

## 2025-06-24 RX ORDER — PHENAZOPYRIDINE HYDROCHLORIDE 100 MG/1
100 TABLET, FILM COATED ORAL DAILY PRN
Qty: 5 TABLET | Refills: 0 | Status: SHIPPED | OUTPATIENT
Start: 2025-06-24 | End: 2025-06-27 | Stop reason: SDUPTHER

## 2025-06-24 RX ORDER — SULFAMETHOXAZOLE AND TRIMETHOPRIM 800; 160 MG/1; MG/1
1 TABLET ORAL DAILY
Qty: 7 TABLET | Refills: 0 | Status: SHIPPED | OUTPATIENT
Start: 2025-06-24 | End: 2025-07-01

## 2025-06-24 RX ORDER — OXYBUTYNIN CHLORIDE 10 MG/1
10 TABLET, EXTENDED RELEASE ORAL DAILY PRN
Qty: 10 TABLET | Refills: 0 | Status: SHIPPED | OUTPATIENT
Start: 2025-06-24

## 2025-06-24 RX ORDER — ACETAMINOPHEN 500 MG
1000 TABLET ORAL EVERY 6 HOURS
Qty: 30 TABLET | Refills: 0 | Status: SHIPPED | OUTPATIENT
Start: 2025-06-24 | End: 2025-06-28

## 2025-06-24 NOTE — TELEPHONE ENCOUNTER
Hub staff attempted to follow warm transfer process and was unsuccessful     Caller: Bryan Tamez    Relationship to patient: Self    Best call back number:   Telephone Information:   Mobile [phone]       Patient is needing: PATIENT HAD CYSTO THIS MORNING WITH DR CORONADO AND HAS A CATH IN PLACE.  HE STATED THAT IT HAS SHIFTED SOMEWHAT AND NOW MOVES IN AND OUT. HE WANTS TO MAKE SURE THAT THIS IS NORMAL.  HE STATED THE CATH IS STILL IN PLACE, BUT HE WAS JUST CONCERNED ABOUT THE MOVEMENT.  PLEASE CALL PT TO ADVISE.  THANK YOU.

## 2025-06-25 NOTE — TELEPHONE ENCOUNTER
Spoke with TAMAR at [facility] regarding this patient clarified medications and things to be expected after surgery she was calling to see if pain was normal from surgery and what medications he was given after surgery.        Hakan,YUNI

## 2025-06-27 ENCOUNTER — TELEPHONE (OUTPATIENT)
Dept: UROLOGY | Facility: CLINIC | Age: 60
End: 2025-06-27

## 2025-06-27 ENCOUNTER — TELEPHONE (OUTPATIENT)
Dept: UROLOGY | Facility: CLINIC | Age: 60
End: 2025-06-27
Payer: COMMERCIAL

## 2025-06-27 DIAGNOSIS — D49.4 BLADDER TUMOR: ICD-10-CM

## 2025-06-27 RX ORDER — PHENAZOPYRIDINE HYDROCHLORIDE 100 MG/1
100 TABLET, FILM COATED ORAL DAILY PRN
Qty: 5 TABLET | Refills: 0 | Status: SHIPPED | OUTPATIENT
Start: 2025-06-27

## 2025-06-27 NOTE — TELEPHONE ENCOUNTER
Hub staff attempted to follow warm transfer process and was unsuccessful     Caller: [facility]    Relationship to patient: LAB[facility]    Best call back number: [phone]    Patient is needing: [facility] IS NEEDING TO REPORT A DX FROM URINARY BLADDER BIOPSY. PLEASE GIVE HER A CALL BACK

## 2025-06-27 NOTE — TELEPHONE ENCOUNTER
Patient called and stated he has burning at the tip of penis while having andujar placement. Patient inquired about when andujar was to be removed and Dr. Bhupendra Fuller MD had mentioned taking out 2-3 days after procedure. I reviewed operative note from [facility] and it didn't specify.  I told patient to continue medication as prescribed.  I will send message to Dr. Bhupendra Fuller MD to confirm the andujar removal on Monday.  Patient is requesting refills on pyridium. All symptoms patient described are within normal range 3 day post op.

## 2025-06-30 ENCOUNTER — OFFICE VISIT (OUTPATIENT)
Dept: UROLOGY | Facility: CLINIC | Age: 60
End: 2025-06-30
Payer: COMMERCIAL

## 2025-06-30 VITALS
HEART RATE: 88 BPM | RESPIRATION RATE: 16 BRPM | WEIGHT: 230 LBS | BODY MASS INDEX: 38.32 KG/M2 | OXYGEN SATURATION: 99 % | HEIGHT: 65 IN | DIASTOLIC BLOOD PRESSURE: 84 MMHG | SYSTOLIC BLOOD PRESSURE: 138 MMHG | TEMPERATURE: 98.4 F

## 2025-06-30 DIAGNOSIS — C67.9 MALIGNANT NEOPLASM OF URINARY BLADDER, UNSPECIFIED SITE: Primary | ICD-10-CM

## 2025-06-30 NOTE — PROGRESS NOTES
Office Visit     Patient Name: Bryan Tamez  : 1965   MRN: [id number]     Chief Complaint  Chief Complaint   Patient presents with   • Results     1 week pathology results       Referring Provider: No ref. provider found    Primary Care Provider: Arpita Mittal APRN     Subjective     History of Present Illness  The patient presents for evaluation of invasive bladder cancer.    Invasive bladder cancer  - He reports non-responsiveness to standard chemotherapy.    Physical capability  - He is physically capable of walking a mile on flat terrain.    Supplemental information: He had a HALO procedure for precancerous stomach cells.         Past Medical History:   Diagnosis Date   • Abnormal ECG    • Arthritis    • Cervical disc disorder    • Headache    • Hyperlipemia    • Hypertension    • Low back pain    • Lumbosacral disc disease    • Sleep apnea     does not wear CPAP     Past Surgical History:   Procedure Laterality Date   • APPENDECTOMY     • CHOLECYSTECTOMY N/A 2020    Procedure: CHOLECYSTECTOMY LAPAROSCOPIC;  Surgeon: Buddy Angel MD;  Location: [facility];  Service: General;  Laterality: N/A;   • COLONOSCOPY     • KNEE ARTHROSCOPY     • TIBIA FRACTURE SURGERY     • WRIST SURGERY       No family history on file.  Social History     Socioeconomic History   • Marital status:    Tobacco Use   • Smoking status: Every Day     Current packs/day: 0.50     Average packs/day: 0.5 packs/day for 45.5 years (22.7 ttl pk-yrs)     Types: Cigarettes     Start date: 1980   • Smokeless tobacco: Never   Vaping Use   • Vaping status: Never Used   Substance and Sexual Activity   • Alcohol use: Yes     Alcohol/week: 1.0 standard drink of alcohol     Types: 1 Cans of beer per week     Comment: Very seldom   • Drug use: Never   • Sexual activity: Yes     Partners: Female     Birth control/protection: None     Comment: Only when wife feels like it.       Current Outpatient Medications:   •   albuterol sulfate  (90 Base) MCG/ACT inhaler, Inhale 2 puffs Every 4 (Four) Hours As Needed for Wheezing or Shortness of Air. AS NEEDED, Disp: 18 g, Rfl: 0  •  aspirin 81 MG EC tablet, Take 1 tablet by mouth Daily., Disp: , Rfl:   •  azelastine (ASTELIN) 0.1 % nasal spray, 2 sprays into the nostril(s) as directed by provider 2 (Two) Times a Day. Use in each nostril as directed, Disp: 30 mL, Rfl: 5  •  Blood Gluc Meter Disp-Strips device, 1 each 2 (Two) Times a Day., Disp: 1 each, Rfl: 0  •  cetirizine (zyrTEC) 10 MG tablet, Take 1 tablet by mouth Daily., Disp: 30 tablet, Rfl: 5  •  citalopram (CeleXA) 20 MG tablet, , Disp: , Rfl:   •  cloNIDine (CATAPRES) 0.1 MG tablet, , Disp: , Rfl:   •  Diclofenac Sodium (VOLTAREN) 1 % gel gel, , Disp: , Rfl:   •  dicyclomine (BENTYL) 10 MG capsule, , Disp: , Rfl:   •  diphenhydrAMINE (BENADRYL) 50 MG capsule, Take 1 capsule by mouth., Disp: , Rfl:   •  furosemide (LASIX) 40 MG tablet, , Disp: , Rfl:   •  gabapentin (NEURONTIN) 300 MG capsule, , Disp: , Rfl:   •  glucose monitor monitoring kit, 1 each As Needed (use to check blood sugar)., Disp: 1 each, Rfl: 0  •  HYDROcodone-acetaminophen (NORCO) 7.5-325 MG per tablet, Take 1 tablet by mouth Every 6 (Six) Hours As Needed for Moderate Pain (NEEDS APPT WITH [facility] BEFORE FURTHER REFILLS)., Disp: 28 tablet, Rfl: 0  •  hydrOXYzine (ATARAX) 25 MG tablet, , Disp: , Rfl:   •  Lancets misc, 1 each 2 (Two) Times a Day As Needed (as need twice daily)., Disp: 100 each, Rfl: 3  •  lisinopril (PRINIVIL,ZESTRIL) 5 MG tablet, Take 1 tablet by mouth Daily., Disp: , Rfl:   •  methocarbamol (ROBAXIN) 750 MG tablet, , Disp: , Rfl:   •  omeprazole (priLOSEC) 40 MG capsule, TAKE 1 CAPSULE BY MOUTH ONCE DAILY. DO NOT CRUSH OR CHEW, Disp: , Rfl:   •  oxybutynin XL (Ditropan XL) 10 MG 24 hr tablet, Take 1 tablet by mouth Daily As Needed (bladder spasm)., Disp: 10 tablet, Rfl: 0  •  oxyCODONE (ROXICODONE) 10 MG tablet, , Disp: , Rfl:   •   "phenazopyridine (PYRIDIUM) 100 MG tablet, Take 1 tablet by mouth Daily As Needed (urinary burning)., Disp: 5 tablet, Rfl: 0  •  potassium chloride 10 MEQ CR tablet, , Disp: , Rfl:   •  predniSONE (DELTASONE) 20 MG tablet, , Disp: , Rfl:   •  pregabalin (LYRICA) 75 MG capsule, , Disp: , Rfl:   •  sennosides-docusate (PERICOLACE) 8.6-50 MG per tablet, , Disp: , Rfl:   •  sucralfate (CARAFATE) 1 g tablet, , Disp: , Rfl:   •  sulfamethoxazole-trimethoprim (Bactrim DS) 800-160 MG per tablet, Take 1 tablet by mouth Daily for 7 days., Disp: 7 tablet, Rfl: 0  •  tamsulosin (FLOMAX) 0.4 MG capsule 24 hr capsule, TAKE 1 CAPSULE BY MOUTH DAILY, Disp: 90 capsule, Rfl: 0  •  Umeclidinium-Vilanterol (ANORO ELLIPTA) 62.5-25 MCG/ACT aerosol powder  inhaler, Inhale 1 puff Daily., Disp: 1 each, Rfl: 1  •  Xifaxan 550 MG tablet, Take 1 tablet by mouth 3 times a day., Disp: , Rfl:     Current Facility-Administered Medications:   •  triamcinolone acetonide (KENALOG-40) injection 40 mg, 40 mg, Intramuscular, Once, Nish Walker APRN  Allergies   Allergen Reactions   • Daptomycin Unknown - Low Severity     Eosinophilic pneumonia   • Ceftaroline Rash   • Doxycycline Rash     ? Rash - d/t chart hx   • Rifampin Diarrhea     Objective   Visit Vitals  /84   Pulse 88   Temp 98.4 °F (36.9 °C)   Resp 16   Ht 165.1 cm (65\")   Wt 104 kg (230 lb)   SpO2 99%   BMI 38.27 kg/m²      Body mass index is 38.27 kg/m².     Physical exam was notable for: Obese    Labs  Lab Results   Component Value Date    COLORU Yellow 04/30/2025    CLARITYU Clear 04/30/2025    SPECGRAV 1.020 04/30/2025    PHUR 7.0 04/30/2025    LEUKOCYTESUR Small (1+) (A) 04/30/2025    NITRITE Negative 04/30/2025    PROTEINPOCUA 30 mg/dL (A) 04/30/2025    GLUCOSEUR Negative 04/30/2025    KETONESU Negative 04/30/2025    UROBILINOGEN 0.2 E.U./dL 04/30/2025    BILIRUBINUR Negative 04/30/2025    RBCUR Large (A) 04/30/2025      No results found for: \"WBCUA\", \"RBCUA\", \"BACTERIA\", " "\"HYALCASTU\", \"SQUAMEPIUA\"     Lab Results   Component Value Date    WBC 10.65 (H) 10/07/2024    HGB 14.5 10/07/2024    HCT 43.8 10/07/2024    MCV 88 10/07/2024     10/07/2024     Lab Results   Component Value Date    GLUCOSE 94 05/24/2023    CALCIUM 8.8 05/24/2023     05/24/2023    K 4.8 05/24/2023    CO2 30.0 (H) 05/24/2023     05/24/2023    BUN 15 05/24/2023    BUN 16 04/29/2022    CREATININE 1.17 05/24/2023    CREATININE 1.10 09/19/2022    EGFR 72.7 05/24/2023    EGFR 73.2 04/29/2022    BCR 12.8 05/24/2023    ANIONGAP 7.0 05/24/2023    ALT 29 05/24/2023    AST 24 05/24/2023       Lab Results   Component Value Date    HGBA1C 5.1 05/14/2024        Lab Results   Component Value Date    PSA 0.614 05/10/2021    PSA 0.74 11/25/2019       No results found for: \"URICACIDSTN\", \"VARW3XBLYSJ\", \"AWTI9JUKDD\", \"LABMAGN\"  Lab Results   Component Value Date    AUUF83DJ 25.1 10/07/2024     No results found for: \"CYSTINE\", \"URINEVOLUM\", \"CALCIUMUR\", \"OXALATEU\", \"CITRATEUR\", \"LABPH\", \"URICUR\", \"NAUR\", \"KUR\", \"MAGNESIUMUR\", \"PHOSUR\", \"AMMONIUMUR\", \"CLUR\", \"[id number]\", \"UREAUR\", \"LABCREAUR\"    Lab Results   Component Value Date    PSA 0.614 05/10/2021         Radiographic Studies  CT Chest Without Contrast Diagnostic  Result Date: 6/18/2025  No acute process.    CTDI: 8.57 mGy DLP: 360.67 mGy.cm    This study was performed with techniques to keep radiation doses as low as reasonably achievable (ALARA). Individualized dose reduction techniques using automated exposure control or adjustment of mA and/or kV according to the patient size were employed.     Images were reviewed, interpreted, and dictated by Dr. Robert Scherer MD Transcribed by Rosina Sun PA-C.  This report was signed and finalized on 6/18/2025 11:51 AM by Robert Scherer MD.      CT Abdomen Pelvis With & Without Contrast  Result Date: 6/3/2025  Enhancing mass in the urinary bladder along the superior and left lateral wall is consistent with " malignancy, probably urothelial carcinoma. Cystoscopy is recommended.  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  CTDI: 12.39 mGy DLP: 2211.02 mGy.cm   This study was performed with techniques to keep radiation doses as low as reasonably achievable (ALARA). Individualized dose reduction techniques using automated exposure control or adjustment of mA and/or kV according to the patient size were employed.     Images were reviewed, interpreted, and dictated by EMILY Thurman Transcribed by Rosina Sun PA-C.  This report was signed and finalized on 6/3/2025 3:06 PM by EMILY Colon.        I have reviewed the above labs and imaging.       Assessment / Plan      Diagnoses and all orders for this visit:    1. Malignant neoplasm of urinary bladder, unspecified site (Primary)    59-year-old male with muscle invasive bladder cancer with high risk pathologic variant of poorly differentiated urothelial carcinoma with signet ring cells.  Reported history of precancer of the stomach, status post HALO procedure at .  No clear lymphadenopathy.  Concern for T3 disease based on EUA.    Assessment & Plan  1.  Muscle invasive cT3 bladder cancer  - Patient is ECOG 0.  - Pathology confirms invasive bladder cancer  - CT scan shows no enlarged lymph nodes; chest CT shows no nodules, suggesting cancer confined to bladder  - Discussed neoadjuvant chemotherapy followed by cystectomy (robotic or open) with ileal conduit  - Alternative treatments (different chemotherapy regimens, radiation) discussed, with lower cure rate  - Urgent referral to Dr. Arora at  for further evaluation.  I personally reached out to him.  - Ordered MRI to assess residual disease in/around bladder wall, surgical staging  - Suggested genetic testing of tumor and predispositions for oncologist discussion  - Catheter removed today    PROCEDURE  Catheter removed today.         Patient or  patient representative verbalized consent for the use of Ambient Listening during the visit with  Bhupendra Fuller MD for chart documentation. 6/30/2025  12:42 EDT    Bhupendra Fuller MD  Urologic Surgery  [id number]   [place] [address] ma

## 2025-07-01 ENCOUNTER — HOSPITAL ENCOUNTER (OUTPATIENT)
Dept: MRI IMAGING | Facility: HOSPITAL | Age: 60
Discharge: HOME | End: 2025-07-01
Admitting: UROLOGY
Payer: COMMERCIAL

## 2025-07-01 DIAGNOSIS — C67.9 MALIGNANT NEOPLASM OF URINARY BLADDER, UNSPECIFIED SITE: ICD-10-CM

## 2025-07-01 PROCEDURE — 72197 MRI PELVIS W/O & W/DYE: CPT

## 2025-07-01 PROCEDURE — A9577 INJ MULTIHANCE: HCPCS | Performed by: UROLOGY

## 2025-07-01 PROCEDURE — 25510000002 GADOBENATE DIMEGLUMINE 529 MG/ML SOLUTION: Performed by: UROLOGY

## 2025-07-01 RX ADMIN — GADOBENATE DIMEGLUMINE 10 ML: 529 INJECTION, SOLUTION INTRAVENOUS at 12:33

## (undated) DEVICE — ENDOPOUCH RETRIEVER SPECIMEN RETRIEVAL BAGS: Brand: ENDOPOUCH RETRIEVER

## (undated) DEVICE — ENDOPATH XCEL UNIVERSAL TROCAR STABLILITY SLEEVES: Brand: ENDOPATH XCEL

## (undated) DEVICE — INTENDED FOR TISSUE SEPARATION, AND OTHER PROCEDURES THAT REQUIRE A SHARP SURGICAL BLADE TO PUNCTURE OR CUT.: Brand: BARD-PARKER ® STAINLESS STEEL BLADES

## (undated) DEVICE — SUT MNCRYL PLS ANTIB UD 4/0 PS2 18IN

## (undated) DEVICE — Device

## (undated) DEVICE — ENDOPATH XCEL BLADELESS TROCARS WITH STABILITY SLEEVES: Brand: ENDOPATH XCEL

## (undated) DEVICE — MINI ENDOCUT SCISSOR TIP, DISPOSABLE: Brand: RENEW

## (undated) DEVICE — GLV SURG PREMIERPRO MIC LTX PF SZ7.5 BRN

## (undated) DEVICE — VISUALIZATION SYSTEM: Brand: CLEARIFY

## (undated) DEVICE — LAP PORT CLOSURE GUIDES 5MM AND 10/12MM: Brand: LAP PORT CLOSURE GUIDES 5MM AND 10/12MM

## (undated) DEVICE — PK LAP LASR CHOLE 10

## (undated) DEVICE — SUT VIC 0 UR6 27IN VCP603H

## (undated) DEVICE — APPL CHLORAPREP TINTED 26ML TEAL

## (undated) DEVICE — GLV SURG PREMIERPRO MIC LTX PF SZ8 BRN

## (undated) DEVICE — ENDOPATH XCEL BLUNT TIP TROCARS WITH SMOOTH SLEEVES: Brand: ENDOPATH XCEL

## (undated) DEVICE — CVR HNDL LIGHT RIGID

## (undated) DEVICE — [HIGH FLOW INSUFFLATOR,  DO NOT USE IF PACKAGE IS DAMAGED,  KEEP DRY,  KEEP AWAY FROM SUNLIGHT,  PROTECT FROM HEAT AND RADIOACTIVE SOURCES.]: Brand: PNEUMOSURE